# Patient Record
Sex: FEMALE | Race: WHITE | Employment: OTHER | URBAN - METROPOLITAN AREA
[De-identification: names, ages, dates, MRNs, and addresses within clinical notes are randomized per-mention and may not be internally consistent; named-entity substitution may affect disease eponyms.]

---

## 2021-02-01 DIAGNOSIS — Z23 NEED FOR VACCINATION: ICD-10-CM

## 2021-02-08 ENCOUNTER — PATIENT MESSAGE (OUTPATIENT)
Dept: TELEHEALTH | Age: 66
End: 2021-02-08

## 2021-02-11 ENCOUNTER — IMMUNIZATION (OUTPATIENT)
Dept: LAB | Facility: HOSPITAL | Age: 66
End: 2021-02-11
Attending: HOSPITALIST
Payer: COMMERCIAL

## 2021-02-11 DIAGNOSIS — Z23 NEED FOR VACCINATION: Primary | ICD-10-CM

## 2021-02-11 PROCEDURE — 0011A SARSCOV2 VAC 100MCG/0.5ML IM: CPT

## 2021-03-11 ENCOUNTER — IMMUNIZATION (OUTPATIENT)
Dept: LAB | Facility: HOSPITAL | Age: 66
End: 2021-03-11
Attending: EMERGENCY MEDICINE
Payer: COMMERCIAL

## 2021-03-11 DIAGNOSIS — Z23 NEED FOR VACCINATION: Primary | ICD-10-CM

## 2021-03-11 PROCEDURE — 0012A SARSCOV2 VAC 100MCG/0.5ML IM: CPT

## 2022-12-10 ENCOUNTER — HOSPITAL ENCOUNTER (EMERGENCY)
Facility: HOSPITAL | Age: 67
Discharge: HOME | End: 2022-12-10
Attending: EMERGENCY MEDICINE
Payer: MEDICARE

## 2022-12-10 ENCOUNTER — APPOINTMENT (OUTPATIENT)
Dept: CT IMAGING | Facility: HOSPITAL | Age: 67
End: 2022-12-10
Attending: EMERGENCY MEDICINE
Payer: MEDICARE

## 2022-12-10 VITALS
OXYGEN SATURATION: 98 % | HEART RATE: 77 BPM | TEMPERATURE: 98 F | SYSTOLIC BLOOD PRESSURE: 137 MMHG | DIASTOLIC BLOOD PRESSURE: 81 MMHG | RESPIRATION RATE: 22 BRPM

## 2022-12-10 DIAGNOSIS — N30.00 ACUTE CYSTITIS WITHOUT HEMATURIA: Primary | ICD-10-CM

## 2022-12-10 DIAGNOSIS — N17.9 AKI (ACUTE KIDNEY INJURY) (HCC): ICD-10-CM

## 2022-12-10 DIAGNOSIS — R40.4 TRANSIENT ALTERATION OF AWARENESS: ICD-10-CM

## 2022-12-10 PROBLEM — G62.9 NEUROPATHY: Status: ACTIVE | Noted: 2022-12-10

## 2022-12-10 PROBLEM — I10 HYPERTENSION: Status: ACTIVE | Noted: 2022-12-10

## 2022-12-10 LAB
ALBUMIN SERPL-MCNC: 3.5 G/DL (ref 3.4–5)
ALP LIVER SERPL-CCNC: 74 U/L
ALT SERPL-CCNC: 15 U/L
ANION GAP SERPL CALC-SCNC: 8 MMOL/L (ref 0–18)
AST SERPL-CCNC: 11 U/L (ref 15–37)
BASOPHILS # BLD AUTO: 0.05 X10(3) UL (ref 0–0.2)
BASOPHILS NFR BLD AUTO: 0.5 %
BILIRUB DIRECT SERPL-MCNC: 0.2 MG/DL (ref 0–0.2)
BILIRUB SERPL-MCNC: 0.6 MG/DL (ref 0.1–2)
BILIRUB UR QL: NEGATIVE
BUN BLD-MCNC: 37 MG/DL (ref 7–18)
BUN/CREAT SERPL: 30.1 (ref 10–20)
CALCIUM BLD-MCNC: 9.6 MG/DL (ref 8.5–10.1)
CHLORIDE SERPL-SCNC: 102 MMOL/L (ref 98–112)
CO2 SERPL-SCNC: 26 MMOL/L (ref 21–32)
COLOR UR: YELLOW
CREAT BLD-MCNC: 1.23 MG/DL
DEPRECATED RDW RBC AUTO: 43.8 FL (ref 35.1–46.3)
EOSINOPHIL # BLD AUTO: 0.09 X10(3) UL (ref 0–0.7)
EOSINOPHIL NFR BLD AUTO: 0.9 %
ERYTHROCYTE [DISTWIDTH] IN BLOOD BY AUTOMATED COUNT: 12.3 % (ref 11–15)
FLUAV + FLUBV RNA SPEC NAA+PROBE: NEGATIVE
FLUAV + FLUBV RNA SPEC NAA+PROBE: NEGATIVE
GFR SERPLBLD BASED ON 1.73 SQ M-ARVRAT: 48 ML/MIN/1.73M2 (ref 60–?)
GLUCOSE BLD-MCNC: 89 MG/DL (ref 70–99)
GLUCOSE UR-MCNC: NEGATIVE MG/DL
HCT VFR BLD AUTO: 46.5 %
HGB BLD-MCNC: 15.7 G/DL
HYALINE CASTS #/AREA URNS AUTO: PRESENT /LPF
IMM GRANULOCYTES # BLD AUTO: 0.03 X10(3) UL (ref 0–1)
IMM GRANULOCYTES NFR BLD: 0.3 %
KETONES UR-MCNC: NEGATIVE MG/DL
LIPASE SERPL-CCNC: 257 U/L (ref 73–393)
LYMPHOCYTES # BLD AUTO: 3 X10(3) UL (ref 1–4)
LYMPHOCYTES NFR BLD AUTO: 29.2 %
MCH RBC QN AUTO: 32.6 PG (ref 26–34)
MCHC RBC AUTO-ENTMCNC: 33.8 G/DL (ref 31–37)
MCV RBC AUTO: 96.5 FL
MONOCYTES # BLD AUTO: 1 X10(3) UL (ref 0.1–1)
MONOCYTES NFR BLD AUTO: 9.7 %
NEUTROPHILS # BLD AUTO: 6.11 X10 (3) UL (ref 1.5–7.7)
NEUTROPHILS # BLD AUTO: 6.11 X10(3) UL (ref 1.5–7.7)
NEUTROPHILS NFR BLD AUTO: 59.4 %
NITRITE UR QL STRIP.AUTO: NEGATIVE
OSMOLALITY SERPL CALC.SUM OF ELEC: 290 MOSM/KG (ref 275–295)
PH UR: 5 [PH] (ref 5–8)
PLATELET # BLD AUTO: 291 10(3)UL (ref 150–450)
POTASSIUM SERPL-SCNC: 3.6 MMOL/L (ref 3.5–5.1)
PROT SERPL-MCNC: 6.4 G/DL (ref 6.4–8.2)
PROT UR-MCNC: 30 MG/DL
RBC # BLD AUTO: 4.82 X10(6)UL
RSV RNA SPEC NAA+PROBE: NEGATIVE
SARS-COV-2 RNA RESP QL NAA+PROBE: NOT DETECTED
SODIUM SERPL-SCNC: 136 MMOL/L (ref 136–145)
SP GR UR STRIP: 1.02 (ref 1–1.03)
UROBILINOGEN UR STRIP-ACNC: 2
VIT C UR-MCNC: NEGATIVE MG/DL
WBC # BLD AUTO: 10.3 X10(3) UL (ref 4–11)

## 2022-12-10 PROCEDURE — 99285 EMERGENCY DEPT VISIT HI MDM: CPT

## 2022-12-10 PROCEDURE — 93010 ELECTROCARDIOGRAM REPORT: CPT

## 2022-12-10 PROCEDURE — 85025 COMPLETE CBC W/AUTO DIFF WBC: CPT | Performed by: EMERGENCY MEDICINE

## 2022-12-10 PROCEDURE — 87086 URINE CULTURE/COLONY COUNT: CPT | Performed by: EMERGENCY MEDICINE

## 2022-12-10 PROCEDURE — 96360 HYDRATION IV INFUSION INIT: CPT

## 2022-12-10 PROCEDURE — 80076 HEPATIC FUNCTION PANEL: CPT | Performed by: EMERGENCY MEDICINE

## 2022-12-10 PROCEDURE — 99284 EMERGENCY DEPT VISIT MOD MDM: CPT

## 2022-12-10 PROCEDURE — 93005 ELECTROCARDIOGRAM TRACING: CPT

## 2022-12-10 PROCEDURE — 80048 BASIC METABOLIC PNL TOTAL CA: CPT | Performed by: EMERGENCY MEDICINE

## 2022-12-10 PROCEDURE — 81001 URINALYSIS AUTO W/SCOPE: CPT | Performed by: EMERGENCY MEDICINE

## 2022-12-10 PROCEDURE — 70450 CT HEAD/BRAIN W/O DYE: CPT | Performed by: EMERGENCY MEDICINE

## 2022-12-10 PROCEDURE — 96361 HYDRATE IV INFUSION ADD-ON: CPT

## 2022-12-10 PROCEDURE — 83690 ASSAY OF LIPASE: CPT | Performed by: EMERGENCY MEDICINE

## 2022-12-10 PROCEDURE — 0241U SARS-COV-2/FLU A AND B/RSV BY PCR (GENEXPERT): CPT | Performed by: EMERGENCY MEDICINE

## 2022-12-10 RX ORDER — CEPHALEXIN 500 MG/1
500 CAPSULE ORAL 2 TIMES DAILY
Qty: 10 CAPSULE | Refills: 0 | Status: SHIPPED | OUTPATIENT
Start: 2022-12-10 | End: 2022-12-15

## 2022-12-10 NOTE — ED INITIAL ASSESSMENT (HPI)
Patient arrives through triage in wheelchair with daughter with multiple c/o. Patient c/o of nausea, vomiting, diarrhea since Sunday, patient and daughter report confusion since today. Patient alert and oriented x4 able to answer questions appropriately, albeit slowly.

## 2022-12-11 LAB
ATRIAL RATE: 82 BPM
P AXIS: 44 DEGREES
P-R INTERVAL: 122 MS
Q-T INTERVAL: 388 MS
QRS DURATION: 76 MS
QTC CALCULATION (BEZET): 453 MS
R AXIS: 43 DEGREES
T AXIS: 53 DEGREES
VENTRICULAR RATE: 82 BPM

## 2023-07-11 ENCOUNTER — TELEPHONE (OUTPATIENT)
Dept: HEMATOLOGY/ONCOLOGY | Facility: HOSPITAL | Age: 68
End: 2023-07-11

## 2023-07-24 ENCOUNTER — OFFICE VISIT (OUTPATIENT)
Dept: HEMATOLOGY/ONCOLOGY | Facility: HOSPITAL | Age: 68
End: 2023-07-24
Attending: SPECIALIST
Payer: MEDICARE

## 2023-07-24 ENCOUNTER — TELEPHONE (OUTPATIENT)
Dept: HEMATOLOGY/ONCOLOGY | Facility: HOSPITAL | Age: 68
End: 2023-07-24

## 2023-07-24 VITALS
TEMPERATURE: 98 F | WEIGHT: 130.81 LBS | SYSTOLIC BLOOD PRESSURE: 135 MMHG | OXYGEN SATURATION: 98 % | HEART RATE: 79 BPM | BODY MASS INDEX: 24.07 KG/M2 | RESPIRATION RATE: 18 BRPM | HEIGHT: 62 IN | DIASTOLIC BLOOD PRESSURE: 66 MMHG

## 2023-07-24 DIAGNOSIS — C49.9 LEIOMYOSARCOMA (HCC): Primary | ICD-10-CM

## 2023-07-24 PROCEDURE — 99205 OFFICE O/P NEW HI 60 MIN: CPT | Performed by: SPECIALIST

## 2023-07-24 PROCEDURE — G2212 PROLONG OUTPT/OFFICE VIS: HCPCS | Performed by: SPECIALIST

## 2023-07-24 RX ORDER — LOSARTAN POTASSIUM AND HYDROCHLOROTHIAZIDE 12.5; 5 MG/1; MG/1
1 TABLET ORAL DAILY
COMMUNITY
Start: 2022-05-21

## 2023-07-24 RX ORDER — DULOXETIN HYDROCHLORIDE 30 MG/1
30 CAPSULE, DELAYED RELEASE ORAL 3 TIMES DAILY
COMMUNITY
Start: 2023-06-29

## 2023-07-24 RX ORDER — LORAZEPAM 0.5 MG/1
0.5 TABLET ORAL NIGHTLY
COMMUNITY
Start: 2023-06-20

## 2023-07-24 RX ORDER — MELATONIN
1000 DAILY
COMMUNITY
Start: 2021-10-25

## 2023-07-24 NOTE — TELEPHONE ENCOUNTER
Called and spoke with Frank Dwyer from [facility] pathology department to inquire if they will test a tumor sample for estrogen receptors per Dr. Mauro Mason. She stated they do not do that and our office will need to send [facility] pathology department a fax request with the specific case ID number, patient consent for release of information, and fed ex label. I stated understanding and thanked her for the information.

## 2023-07-24 NOTE — TELEPHONE ENCOUNTER
Called and unable to reach patient. Left VM letting her know we have to request additional records from [facility] and they require her written authorization/consent for them to share medical information with us. Told her she needs to sign a form in person. Let her know to give our office a call back to discuss further. Provided office phone number.

## 2023-07-25 ENCOUNTER — DOCUMENTATION ONLY (OUTPATIENT)
Dept: HEMATOLOGY/ONCOLOGY | Facility: HOSPITAL | Age: 68
End: 2023-07-25

## 2023-07-25 NOTE — PROGRESS NOTES
Faxed Fed Ex Account Number for [facility] and patient's authorization/consent form for [facility] Pathology Department to send us the tissue block sides for Case ID # [id number] from 12/16/2022 to our [facility] Pathology Department to be tested for estrogen receptors. Forms faxed to [phone]. Fax success confirmation received. Will notify [place] pathology department to be expecting this specimen and to test for estrogen receptors once they receive it.

## 2023-07-26 ENCOUNTER — DOCUMENTATION ONLY (OUTPATIENT)
Dept: HEMATOLOGY/ONCOLOGY | Facility: HOSPITAL | Age: 68
End: 2023-07-26

## 2023-07-26 DIAGNOSIS — C49.9 LEIOMYOSARCOMA (HCC): Primary | ICD-10-CM

## 2023-07-26 NOTE — PROGRESS NOTES
Called and spoke with Arlette Milton, from pathology to let her know Dr. Harleen Jacques requested this patient's tissue block slides for case ID # [id number] done on 12/16/2022 from [facility] pathology department to be sent to our pathology department. Told her he wants to test the sample for estrogen receptors. She requested to put a order in and specify to test for estrogen receptors in the comment. Pathology specimen order put in. Patricio Carmona stated they will keep a look out for the tissue block slides.

## 2023-08-02 ENCOUNTER — TELEPHONE (OUTPATIENT)
Dept: HEMATOLOGY/ONCOLOGY | Facility: HOSPITAL | Age: 68
End: 2023-08-02

## 2023-08-07 ENCOUNTER — LAB ENCOUNTER (OUTPATIENT)
Dept: LAB | Facility: HOSPITAL | Age: 68
End: 2023-08-07
Attending: SPECIALIST
Payer: MEDICARE

## 2023-08-07 DIAGNOSIS — C49.9 LEIOMYOSARCOMA (HCC): ICD-10-CM

## 2023-08-07 PROCEDURE — 88342 IMHCHEM/IMCYTCHM 1ST ANTB: CPT

## 2023-08-07 PROCEDURE — 88360 TUMOR IMMUNOHISTOCHEM/MANUAL: CPT

## 2023-08-07 PROCEDURE — 88323 CONSLTJ&REPRT MATRL PREP SLD: CPT

## 2023-08-07 PROCEDURE — 88321 CONSLTJ&REPRT SLD PREP ELSWR: CPT

## 2023-08-18 ENCOUNTER — OFFICE VISIT (OUTPATIENT)
Dept: HEMATOLOGY/ONCOLOGY | Facility: HOSPITAL | Age: 68
End: 2023-08-18
Attending: SPECIALIST
Payer: MEDICARE

## 2023-08-18 VITALS
TEMPERATURE: 98 F | WEIGHT: 124 LBS | RESPIRATION RATE: 18 BRPM | SYSTOLIC BLOOD PRESSURE: 133 MMHG | HEART RATE: 102 BPM | DIASTOLIC BLOOD PRESSURE: 86 MMHG | OXYGEN SATURATION: 98 % | BODY MASS INDEX: 22.82 KG/M2 | HEIGHT: 62 IN

## 2023-08-18 DIAGNOSIS — C49.9 LEIOMYOSARCOMA (HCC): Primary | ICD-10-CM

## 2023-08-18 DIAGNOSIS — C78.01 MALIGNANT NEOPLASM METASTATIC TO RIGHT LUNG (HCC): ICD-10-CM

## 2023-08-18 PROCEDURE — 99214 OFFICE O/P EST MOD 30 MIN: CPT | Performed by: SPECIALIST

## 2023-09-01 ENCOUNTER — HOSPITAL ENCOUNTER (OUTPATIENT)
Dept: NUCLEAR MEDICINE | Facility: HOSPITAL | Age: 68
Discharge: HOME | End: 2023-09-01
Attending: SPECIALIST
Payer: MEDICARE

## 2023-09-01 DIAGNOSIS — C78.01 MALIGNANT NEOPLASM METASTATIC TO RIGHT LUNG (HCC): ICD-10-CM

## 2023-09-01 DIAGNOSIS — C49.9 LEIOMYOSARCOMA (HCC): ICD-10-CM

## 2023-09-01 LAB — GLUCOSE BLDC GLUCOMTR-MCNC: 97 MG/DL (ref 70–99)

## 2023-09-01 PROCEDURE — 82962 GLUCOSE BLOOD TEST: CPT

## 2023-09-01 PROCEDURE — 78815 PET IMAGE W/CT SKULL-THIGH: CPT | Performed by: SPECIALIST

## 2023-09-05 ENCOUNTER — TELEPHONE (OUTPATIENT)
Dept: HEMATOLOGY/ONCOLOGY | Facility: HOSPITAL | Age: 68
End: 2023-09-05

## 2023-09-06 ENCOUNTER — TELEPHONE (OUTPATIENT)
Dept: RADIATION ONCOLOGY | Facility: HOSPITAL | Age: 68
End: 2023-09-06

## 2023-09-12 ENCOUNTER — TELEPHONE (OUTPATIENT)
Dept: RADIATION ONCOLOGY | Facility: HOSPITAL | Age: 68
End: 2023-09-12

## 2023-09-13 ENCOUNTER — TELEPHONE (OUTPATIENT)
Dept: HEMATOLOGY/ONCOLOGY | Facility: HOSPITAL | Age: 68
End: 2023-09-13

## 2023-09-21 ENCOUNTER — TELEPHONE (OUTPATIENT)
Dept: HEMATOLOGY/ONCOLOGY | Facility: HOSPITAL | Age: 68
End: 2023-09-21

## 2023-09-21 NOTE — TELEPHONE ENCOUNTER
This is my second contact to Osvaldo Dias to assist with smoking cessation if interested. She was not home and I left a v.m. with my contact number if interested in contacting me back.  CARLOS Johnson

## 2023-09-26 ENCOUNTER — TELEPHONE (OUTPATIENT)
Dept: HEMATOLOGY/ONCOLOGY | Facility: HOSPITAL | Age: 68
End: 2023-09-26

## 2023-09-27 NOTE — TELEPHONE ENCOUNTER
I had left Dionne prior voice mails and she returned the call today re smoking cessation options. She is interested in quitting now. We discussed the two options includin) In-person consultation with one of our Smoking Cessation Advanced Practice Nurses or the [place] ([facility]). Descriptions of both programs were reviewed and she chose to use the [facility]. A referral was placed for the [facility] and was faxed with permission from the patient. She should be contacted within 24 hours of receipt of the referral.    Some smoking cessation resources will be sent to the patient via the mail for future use. My contact number was provided if questions arise.   CARLOS Seaman      Tobacco cessation counseling given

## 2023-10-01 ENCOUNTER — APPOINTMENT (OUTPATIENT)
Dept: RADIATION ONCOLOGY | Facility: HOSPITAL | Age: 68
End: 2023-10-01
Attending: RADIOLOGY
Payer: MEDICARE

## 2023-10-06 ENCOUNTER — APPOINTMENT (OUTPATIENT)
Dept: RADIATION ONCOLOGY | Facility: HOSPITAL | Age: 68
End: 2023-10-06
Attending: RADIOLOGY
Payer: MEDICARE

## 2023-10-06 PROCEDURE — 77470 SPECIAL RADIATION TREATMENT: CPT | Performed by: RADIOLOGY

## 2023-10-06 PROCEDURE — 77399 UNLISTED PX MED RADJ PHYSICS: CPT | Performed by: RADIOLOGY

## 2023-10-06 PROCEDURE — 77334 RADIATION TREATMENT AID(S): CPT | Performed by: RADIOLOGY

## 2023-10-16 PROCEDURE — 77300 RADIATION THERAPY DOSE PLAN: CPT | Performed by: RADIOLOGY

## 2023-10-16 PROCEDURE — 77293 RESPIRATOR MOTION MGMT SIMUL: CPT | Performed by: RADIOLOGY

## 2023-10-16 PROCEDURE — 77338 DESIGN MLC DEVICE FOR IMRT: CPT | Performed by: RADIOLOGY

## 2023-10-16 PROCEDURE — 77301 RADIOTHERAPY DOSE PLAN IMRT: CPT | Performed by: RADIOLOGY

## 2023-11-01 ENCOUNTER — APPOINTMENT (OUTPATIENT)
Dept: RADIATION ONCOLOGY | Facility: HOSPITAL | Age: 68
End: 2023-11-01
Attending: RADIOLOGY
Payer: MEDICARE

## 2023-11-01 PROCEDURE — 77373 STRTCTC BDY RAD THER TX DLVR: CPT | Performed by: RADIOLOGY

## 2023-11-03 ENCOUNTER — APPOINTMENT (OUTPATIENT)
Dept: RADIATION ONCOLOGY | Facility: HOSPITAL | Age: 68
End: 2023-11-03
Attending: RADIOLOGY
Payer: MEDICARE

## 2023-11-03 PROCEDURE — 77373 STRTCTC BDY RAD THER TX DLVR: CPT | Performed by: RADIOLOGY

## 2023-11-06 ENCOUNTER — APPOINTMENT (OUTPATIENT)
Dept: RADIATION ONCOLOGY | Facility: HOSPITAL | Age: 68
End: 2023-11-06
Attending: RADIOLOGY
Payer: MEDICARE

## 2023-11-06 PROCEDURE — 77373 STRTCTC BDY RAD THER TX DLVR: CPT | Performed by: RADIOLOGY

## 2023-11-08 ENCOUNTER — OFFICE VISIT (OUTPATIENT)
Dept: RADIATION ONCOLOGY | Facility: HOSPITAL | Age: 68
End: 2023-11-08
Attending: RADIOLOGY
Payer: MEDICARE

## 2023-11-08 VITALS
OXYGEN SATURATION: 100 % | BODY MASS INDEX: 24 KG/M2 | SYSTOLIC BLOOD PRESSURE: 130 MMHG | WEIGHT: 129.38 LBS | RESPIRATION RATE: 16 BRPM | HEART RATE: 66 BPM | TEMPERATURE: 97 F | DIASTOLIC BLOOD PRESSURE: 57 MMHG

## 2023-11-08 DIAGNOSIS — C78.01 MALIGNANT NEOPLASM METASTATIC TO RIGHT LUNG (HCC): Primary | ICD-10-CM

## 2023-11-08 DIAGNOSIS — C78.01 SECONDARY MALIGNANCY OF RIGHT UPPER LOBE OF LUNG (HCC): Primary | ICD-10-CM

## 2023-11-08 DIAGNOSIS — C49.9 LEIOMYOSARCOMA (HCC): ICD-10-CM

## 2023-11-08 PROCEDURE — 77373 STRTCTC BDY RAD THER TX DLVR: CPT | Performed by: RADIOLOGY

## 2023-11-08 NOTE — PATIENT INSTRUCTIONS
Follow up with Dr. Juan Garcia in 3 months. Tyler Wood will call you to schedule your follow up appointment. Please call [phone] with any radiation questions. Schedule your CT scan prior to your follow up. Call [phone] to schedule.

## 2023-11-08 NOTE — TELEPHONE ENCOUNTER
Called patient to schedule follow up with Dr. Sarahi Means for 12 weeks after her Radiation- Agreed to follow up on 12/15 will have schedulers call patient to schedule PET for a few days before follow up. Patient aware and in agreement with date of appointment and plan.

## 2023-11-10 ENCOUNTER — APPOINTMENT (OUTPATIENT)
Dept: RADIATION ONCOLOGY | Facility: HOSPITAL | Age: 68
End: 2023-11-10
Attending: RADIOLOGY
Payer: MEDICARE

## 2023-11-10 PROCEDURE — 77373 STRTCTC BDY RAD THER TX DLVR: CPT | Performed by: RADIOLOGY

## 2023-11-10 PROCEDURE — 77336 RADIATION PHYSICS CONSULT: CPT | Performed by: RADIOLOGY

## 2023-11-27 ENCOUNTER — DOCUMENTATION ONLY (OUTPATIENT)
Dept: HEMATOLOGY/ONCOLOGY | Facility: HOSPITAL | Age: 68
End: 2023-11-27

## 2023-11-27 NOTE — PROGRESS NOTES
ONCOLOGY NUTRITION SCREEN complete as triggered by [facility] dx of malignant neoplasm metastatic to lung. Chart reviewed. Pt appears nutritionally stable at present. RD available on consult.

## 2023-12-04 ENCOUNTER — TELEPHONE (OUTPATIENT)
Dept: RADIATION ONCOLOGY | Facility: HOSPITAL | Age: 68
End: 2023-12-04

## 2023-12-04 NOTE — TELEPHONE ENCOUNTER
12/4/23 Spoke with Lopez Boothe and she said that she will call and get the CT scan set up.  I told her as soon as we see when it is we can call her to schedule her visit in early Feb.

## 2023-12-11 ENCOUNTER — HOSPITAL ENCOUNTER (OUTPATIENT)
Dept: NUCLEAR MEDICINE | Facility: HOSPITAL | Age: 68
Discharge: HOME | End: 2023-12-11
Attending: SPECIALIST
Payer: MEDICARE

## 2023-12-11 DIAGNOSIS — C78.01 MALIGNANT NEOPLASM METASTATIC TO RIGHT LUNG (HCC): ICD-10-CM

## 2023-12-11 DIAGNOSIS — C49.9 LEIOMYOSARCOMA (HCC): ICD-10-CM

## 2023-12-11 LAB — GLUCOSE BLDC GLUCOMTR-MCNC: 85 MG/DL (ref 70–99)

## 2023-12-11 PROCEDURE — 78815 PET IMAGE W/CT SKULL-THIGH: CPT | Performed by: SPECIALIST

## 2023-12-11 PROCEDURE — 82962 GLUCOSE BLOOD TEST: CPT

## 2023-12-13 ENCOUNTER — TELEPHONE (OUTPATIENT)
Dept: RADIATION ONCOLOGY | Facility: HOSPITAL | Age: 68
End: 2023-12-13

## 2023-12-13 NOTE — TELEPHONE ENCOUNTER
Pt scheduled CT scan for 12/18/2023. Called pt to notify her that this should be done 3 months after radiation completed, so February 2024. Pt is going to call CS to re-schedule scan. Aware Timur Gerber, our , will contact her to schedule a follow up with Dr. Gela Gasca after CT scan is complete. Pt states her understanding of the above information.

## 2023-12-15 ENCOUNTER — OFFICE VISIT (OUTPATIENT)
Dept: HEMATOLOGY/ONCOLOGY | Facility: HOSPITAL | Age: 68
End: 2023-12-15
Attending: SPECIALIST
Payer: MEDICARE

## 2023-12-15 VITALS
HEART RATE: 77 BPM | RESPIRATION RATE: 16 BRPM | HEIGHT: 62 IN | OXYGEN SATURATION: 100 % | WEIGHT: 132 LBS | SYSTOLIC BLOOD PRESSURE: 122 MMHG | BODY MASS INDEX: 24.29 KG/M2 | TEMPERATURE: 98 F | DIASTOLIC BLOOD PRESSURE: 79 MMHG

## 2023-12-15 DIAGNOSIS — C49.9 LEIOMYOSARCOMA (HCC): Primary | ICD-10-CM

## 2023-12-15 DIAGNOSIS — C78.00 SECONDARY SARCOMA OF LUNG, UNSPECIFIED LATERALITY (HCC): ICD-10-CM

## 2023-12-15 DIAGNOSIS — F17.200 NEEDS SMOKING CESSATION EDUCATION: ICD-10-CM

## 2023-12-15 DIAGNOSIS — C78.01 MALIGNANT NEOPLASM METASTATIC TO RIGHT LUNG (HCC): ICD-10-CM

## 2023-12-15 DIAGNOSIS — Z45.2 ENCOUNTER FOR CARE RELATED TO PORT-A-CATH: Primary | ICD-10-CM

## 2023-12-15 DIAGNOSIS — Z72.0 TOBACCO USE: ICD-10-CM

## 2023-12-15 DIAGNOSIS — C49.9 SECONDARY SARCOMA OF LUNG, UNSPECIFIED LATERALITY (HCC): ICD-10-CM

## 2023-12-15 PROCEDURE — 96523 IRRIG DRUG DELIVERY DEVICE: CPT

## 2023-12-15 PROCEDURE — 99214 OFFICE O/P EST MOD 30 MIN: CPT | Performed by: SPECIALIST

## 2023-12-15 RX ORDER — SODIUM CHLORIDE 9 MG/ML
10 INJECTION INTRAVENOUS ONCE
OUTPATIENT
Start: 2023-12-15

## 2023-12-15 RX ORDER — HEPARIN SODIUM (PORCINE) LOCK FLUSH IV SOLN 100 UNIT/ML 100 UNIT/ML
5 SOLUTION INTRAVENOUS ONCE
Status: COMPLETED | OUTPATIENT
Start: 2023-12-15 | End: 2023-12-15

## 2023-12-15 RX ORDER — HEPARIN SODIUM (PORCINE) LOCK FLUSH IV SOLN 100 UNIT/ML 100 UNIT/ML
5 SOLUTION INTRAVENOUS ONCE
OUTPATIENT
Start: 2023-12-15

## 2023-12-15 RX ORDER — HEPARIN SODIUM (PORCINE) LOCK FLUSH IV SOLN 100 UNIT/ML 100 UNIT/ML
5 SOLUTION INTRAVENOUS ONCE
Status: CANCELLED | OUTPATIENT
Start: 2023-12-15

## 2023-12-15 RX ADMIN — HEPARIN SODIUM (PORCINE) LOCK FLUSH IV SOLN 100 UNIT/ML 500 UNITS: 100 SOLUTION INTRAVENOUS at 10:30:00

## 2023-12-18 ENCOUNTER — TELEPHONE (OUTPATIENT)
Dept: HEMATOLOGY/ONCOLOGY | Facility: HOSPITAL | Age: 68
End: 2023-12-18

## 2023-12-19 NOTE — TELEPHONE ENCOUNTER
A referral was placed for smoking cessation re Mianlizeth Charles. I had spoken with her in 9/2023 (see telephone encounter) for the same purpose. At that time she was interested in utilizing the [facility]) for support and a referral was placed. Since then, she has not utilized that support, but is interested in quitting smoking again. She was offered this or the Smoking Cessation [facility] clinic. She reports having too many office visits and prefers the [facility] again. The referral was sent and she should contact me if she does not hear from them within 48 hours. She also mentioned that her peripheral neuropathy pain has worsened and she forgot to mention this in her office visit last week. She is interested in medication, but had tried Gabapentin in the past and it was not helpful.  I will relay this information to her team.  CARLOS Cross

## 2023-12-19 NOTE — TELEPHONE ENCOUNTER
Attempted to reach patient regarding recommendations to start Lyrica for peripheral neuropathy. Waiting for call back from patient.

## 2023-12-20 ENCOUNTER — TELEPHONE (OUTPATIENT)
Dept: HEMATOLOGY/ONCOLOGY | Facility: HOSPITAL | Age: 68
End: 2023-12-20

## 2024-02-09 ENCOUNTER — HOSPITAL ENCOUNTER (OUTPATIENT)
Dept: CT IMAGING | Facility: HOSPITAL | Age: 69
Discharge: HOME | End: 2024-02-09
Attending: RADIOLOGY
Payer: MEDICARE

## 2024-02-09 DIAGNOSIS — C78.01 SECONDARY MALIGNANCY OF RIGHT UPPER LOBE OF LUNG (HCC): ICD-10-CM

## 2024-02-09 PROCEDURE — 71250 CT THORAX DX C-: CPT | Performed by: RADIOLOGY

## 2024-02-12 ENCOUNTER — TELEPHONE (OUTPATIENT)
Dept: HEMATOLOGY/ONCOLOGY | Facility: HOSPITAL | Age: 69
End: 2024-02-12

## 2024-02-12 NOTE — TELEPHONE ENCOUNTER
Called patient to let her know that an appointment with Dr. Zelaya has been made for her to follow up on Feb. 16th at 12:00 pm here at the cancer center.  Asked her to call back if this does not work for her, callback number provided.

## 2024-02-14 ENCOUNTER — OFFICE VISIT (OUTPATIENT)
Dept: RADIATION ONCOLOGY | Facility: HOSPITAL | Age: 69
End: 2024-02-14
Attending: RADIOLOGY
Payer: MEDICARE

## 2024-02-14 VITALS
DIASTOLIC BLOOD PRESSURE: 60 MMHG | WEIGHT: 137.63 LBS | SYSTOLIC BLOOD PRESSURE: 132 MMHG | HEART RATE: 71 BPM | TEMPERATURE: 97 F | RESPIRATION RATE: 16 BRPM | BODY MASS INDEX: 25 KG/M2 | OXYGEN SATURATION: 98 %

## 2024-02-14 DIAGNOSIS — C78.01 SECONDARY MALIGNANCY OF RIGHT UPPER LOBE OF LUNG (HCC): Primary | ICD-10-CM

## 2024-02-14 PROCEDURE — 99211 OFF/OP EST MAY X REQ PHY/QHP: CPT

## 2024-02-14 NOTE — PROGRESS NOTES
Nursing Follow-Up Note    Patient: Dionne Bello  YOB: 1955  Age: 68 year old  Radiation Oncologist: Dr. Joe Solares  Referring Physician: Joe Solares  Chief Complaint:   Chief Complaint   Patient presents with    Radiation     Date: 2/14/2024    Toxicities:   Fatigue Grade 1= Fatigue relieved by rest  Cough Grade 0= None  Dyspnea Grade 0= None      Vital Signs: /60 (BP Location: Left arm, Patient Position: Sitting, Cuff Size: adult)   Pulse 71   Temp 97.2 °F (36.2 °C) (Temporal)   Resp 16   Wt 62.4 kg (137 lb 9.6 oz)   SpO2 98%   BMI 25.17 kg/m² ,   Wt Readings from Last 6 Encounters:   02/14/24 62.4 kg (137 lb 9.6 oz)   12/15/23 59.9 kg (132 lb)   11/08/23 58.7 kg (129 lb 6.4 oz)   09/12/23 57.2 kg (126 lb 3.2 oz)   08/18/23 56.2 kg (124 lb)   07/24/23 59.3 kg (130 lb 12.8 oz)       Allergies:    Allergies   Allergen Reactions    Iopamidol ANAPHYLAXIS    Radiology Contrast Iodinated Dyes ANAPHYLAXIS       Nursing Note:   Pt has a cold now.   Continues to smoke 3 cigarettes daily.  Eating and drinking well.   Pt to see Dr. Zelaya 2/16/24.  Dr. Leobardo Solares to see pt.

## 2024-02-14 NOTE — PATIENT INSTRUCTIONS
Follow up with Dr. Leobardo Solares as needed.    Please call [phone] with any radiation questions.

## 2024-02-15 NOTE — PROGRESS NOTES
[facility]    PATIENT'S NAME: FAHEEM VALLE   RADIATION ONCOLOGIST: Joe Solares MD   PATIENT ACCOUNT #: [id number] LOCATION: [facility]   MEDICAL RECORD #: [id number] YOB: 1955   FOLLOW-UP DATE: 02/14/2024       RADIATION ONCOLOGY FOLLOW-UP NOTE    REFERRING PHYSICIAN:  Justyn Zelaya MD.    DIAGNOSIS:  Metastatic leiomyosarcoma.    REGION TREATED:  Right upper lobe mass, 5000 cGy, completed 11/10/2023.    INTERVAL SINCE COMPLETION OF RADIATION THERAPY:  Three months.    PATIENT STATUS:  Control within treatment area but significant progression throughout remainder of lung.    HISTORY:  The patient is a 68-year-old female who presents today for a routine followup visit.  She has a long history of leiomyosarcoma initially diagnosed back in 2014.  Her treatments had all been done at an outside institution until fairly recently.  At the time of diagnosis, she underwent surgery and resection of the peritoneal mass which showed a high-grade leiomyosarcoma, and she was treated with adjuvant radiation.  She was later found to have a scalp mass in 2015 and resection showed this to also be leiomyosarcoma.  By 2019, a biopsy of the left breast showed invasive mammary carcinoma noted to be ER/CA negative and HER2/edin positive.  She was treated with neoadjuvant chemotherapy, followed by a left breast lumpectomy and sentinel lymph node biopsy with a complete response.  She then received adjuvant trastuzumab and pertuzumab alongside adjuvant radiation.  An additional scalp mass popped up in 2020 and was again a leiomyosarcoma.  This was treated with scalp debridement and reconstruction.  By December 2022, a right superior anterior chest wall mass was found and biopsy proven to represent recurrent leiomyosarcoma again.  She was switched to trabectedin and recently received radiation to the anterior chest wall mass completing in August 2023 at [place].  A PET scan on June 28, 2023, showed 2  metastatic lesions including a second small lesion in the right upper lobe.  The chest wall lesion had been treated with radiation, but nothing had been administered to the right upper lobe mass.  As she then transferred her care to Dr. Zelaya, he recommended consultation with Radiation Oncology to discuss treatment to the sole remaining site of disease.  This area was then treated in the right upper lobe to 5000 cGy in 5 fractions completing on 11/10/2023.    The patient presents today for her first followup visit since completing her most recent course of radiation.  Overall, she feels well and has no particular symptoms or issues.  She denies any chest wall discomfort and has had no change in breathing.  She has some occasional cough, but no shortness of breath or hemoptysis.  Her appetite is normal and her weight has risen.  She denies dysphagia or other issues or complaints.    Her most recent imaging is a CT scan of the chest done on 02/09/2024.  This reveals essentially control within the recently radiated region, though there are dozens of new bilateral pulmonary nodules measuring 5 mm or less deemed highly suspicious for metastatic pulmonary nodularities.    PHYSICAL EXAMINATION:    VITAL SIGNS:  On exam today, the patient is noted to have a blood pressure of 132/60, pulse of 71, respiratory rate of 16, and temperature of 97.2.  She has a pain score of 0 and a weight of 137 pounds.  NECK:  Supple with no lymphadenopathy.  LUNGS:  Clear to auscultation bilaterally.  HEART:  Regular rate and rhythm.  Normal S1, S2.  No audible murmurs.    LYMPHATICS:  There is no supraclavicular, axillary, or inguinal lymphadenopathy.  ABDOMEN:  Benign.    IMPRESSION AND RECOMMENDATIONS:  Overall, the patient recovered from a most recent course of radiation rather well.  She did not have any side effects or lingering problems.  She did have a good response in the area which was treated, but this has become rather moot as  dozens of new lesions were found on the most recent scan.    I did bring this scan to the attention of Dr. Zelaya, and he will be seeing her in a couple days to talk about her next potential systemic therapy option.  I told the patient that I see no role for radiation in the immediate future.  For that reason, I did not schedule a specific followup visit in my clinic.  Of course, if there should be an indication for radiation in the future, I would be happy to see the patient again at that time.    Thank you very much for allowing me the opportunity to participate in the care of this patient.  If there should be any questions regarding the radiotherapy, please feel free to contact me at any time.    Dictated By Joe Solares MD  d: 02/14/2024 10:30:31  t: 02/15/2024 02:57:54  [facility] [id number]  NAD/    cc: MD Dr. Terry Schroeder

## 2024-02-16 ENCOUNTER — TELEPHONE (OUTPATIENT)
Dept: HEMATOLOGY/ONCOLOGY | Facility: HOSPITAL | Age: 69
End: 2024-02-16

## 2024-02-16 ENCOUNTER — OFFICE VISIT (OUTPATIENT)
Dept: HEMATOLOGY/ONCOLOGY | Facility: HOSPITAL | Age: 69
End: 2024-02-16
Attending: SPECIALIST
Payer: MEDICARE

## 2024-02-16 VITALS
RESPIRATION RATE: 16 BRPM | TEMPERATURE: 98 F | SYSTOLIC BLOOD PRESSURE: 133 MMHG | HEIGHT: 62 IN | BODY MASS INDEX: 24.44 KG/M2 | DIASTOLIC BLOOD PRESSURE: 70 MMHG | WEIGHT: 132.81 LBS | HEART RATE: 90 BPM | OXYGEN SATURATION: 98 %

## 2024-02-16 DIAGNOSIS — Z45.2 ENCOUNTER FOR CARE RELATED TO PORT-A-CATH: Primary | ICD-10-CM

## 2024-02-16 DIAGNOSIS — C49.9 LEIOMYOSARCOMA (HCC): Primary | ICD-10-CM

## 2024-02-16 DIAGNOSIS — C49.9 LEIOMYOSARCOMA (HCC): ICD-10-CM

## 2024-02-16 LAB
ALBUMIN SERPL-MCNC: 4.6 G/DL (ref 3.2–4.8)
ALBUMIN/GLOB SERPL: 1.8 {RATIO} (ref 1–2)
ALP LIVER SERPL-CCNC: 83 U/L
ALT SERPL-CCNC: 13 U/L
ANION GAP SERPL CALC-SCNC: 6 MMOL/L (ref 0–18)
AST SERPL-CCNC: 23 U/L (ref ?–34)
BASOPHILS # BLD AUTO: 0.03 X10(3) UL (ref 0–0.2)
BASOPHILS NFR BLD AUTO: 0.6 %
BILIRUB SERPL-MCNC: 0.3 MG/DL (ref 0.2–1.1)
BUN BLD-MCNC: 12 MG/DL (ref 9–23)
BUN/CREAT SERPL: 16.2 (ref 10–20)
CALCIUM BLD-MCNC: 9.5 MG/DL (ref 8.7–10.4)
CHLORIDE SERPL-SCNC: 109 MMOL/L (ref 98–112)
CO2 SERPL-SCNC: 28 MMOL/L (ref 21–32)
CREAT BLD-MCNC: 0.74 MG/DL
DEPRECATED RDW RBC AUTO: 45.6 FL (ref 35.1–46.3)
EGFRCR SERPLBLD CKD-EPI 2021: 88 ML/MIN/1.73M2 (ref 60–?)
EOSINOPHIL # BLD AUTO: 0.06 X10(3) UL (ref 0–0.7)
EOSINOPHIL NFR BLD AUTO: 1.3 %
ERYTHROCYTE [DISTWIDTH] IN BLOOD BY AUTOMATED COUNT: 12.8 % (ref 11–15)
GLOBULIN PLAS-MCNC: 2.5 G/DL (ref 2.8–4.4)
GLUCOSE BLD-MCNC: 88 MG/DL (ref 70–99)
HCT VFR BLD AUTO: 40.1 %
HGB BLD-MCNC: 14 G/DL
IMM GRANULOCYTES # BLD AUTO: 0.01 X10(3) UL (ref 0–1)
IMM GRANULOCYTES NFR BLD: 0.2 %
LYMPHOCYTES # BLD AUTO: 1.1 X10(3) UL (ref 1–4)
LYMPHOCYTES NFR BLD AUTO: 23.1 %
MAGNESIUM SERPL-MCNC: 1.9 MG/DL (ref 1.6–2.6)
MCH RBC QN AUTO: 34.1 PG (ref 26–34)
MCHC RBC AUTO-ENTMCNC: 34.9 G/DL (ref 31–37)
MCV RBC AUTO: 97.6 FL
MONOCYTES # BLD AUTO: 0.56 X10(3) UL (ref 0.1–1)
MONOCYTES NFR BLD AUTO: 11.7 %
NEUTROPHILS # BLD AUTO: 3.01 X10 (3) UL (ref 1.5–7.7)
NEUTROPHILS # BLD AUTO: 3.01 X10(3) UL (ref 1.5–7.7)
NEUTROPHILS NFR BLD AUTO: 63.1 %
OSMOLALITY SERPL CALC.SUM OF ELEC: 295 MOSM/KG (ref 275–295)
PHOSPHATE SERPL-MCNC: 3.3 MG/DL (ref 2.4–5.1)
PLATELET # BLD AUTO: 239 10(3)UL (ref 150–450)
POTASSIUM SERPL-SCNC: 4 MMOL/L (ref 3.5–5.1)
PROT SERPL-MCNC: 7.1 G/DL (ref 5.7–8.2)
RBC # BLD AUTO: 4.11 X10(6)UL
SODIUM SERPL-SCNC: 143 MMOL/L (ref 136–145)
WBC # BLD AUTO: 4.8 X10(3) UL (ref 4–11)

## 2024-02-16 PROCEDURE — G2211 COMPLEX E/M VISIT ADD ON: HCPCS | Performed by: SPECIALIST

## 2024-02-16 PROCEDURE — 83735 ASSAY OF MAGNESIUM: CPT

## 2024-02-16 PROCEDURE — 84100 ASSAY OF PHOSPHORUS: CPT

## 2024-02-16 PROCEDURE — 85025 COMPLETE CBC W/AUTO DIFF WBC: CPT

## 2024-02-16 PROCEDURE — 99215 OFFICE O/P EST HI 40 MIN: CPT | Performed by: SPECIALIST

## 2024-02-16 PROCEDURE — 80053 COMPREHEN METABOLIC PANEL: CPT

## 2024-02-16 PROCEDURE — 36591 DRAW BLOOD OFF VENOUS DEVICE: CPT

## 2024-02-16 RX ORDER — HEPARIN SODIUM (PORCINE) LOCK FLUSH IV SOLN 100 UNIT/ML 100 UNIT/ML
5 SOLUTION INTRAVENOUS ONCE
OUTPATIENT
Start: 2024-02-16

## 2024-02-16 RX ORDER — SODIUM CHLORIDE 9 MG/ML
10 INJECTION, SOLUTION INTRAMUSCULAR; INTRAVENOUS; SUBCUTANEOUS ONCE
OUTPATIENT
Start: 2024-02-16

## 2024-02-16 RX ORDER — HEPARIN SODIUM (PORCINE) LOCK FLUSH IV SOLN 100 UNIT/ML 100 UNIT/ML
5 SOLUTION INTRAVENOUS ONCE
Status: COMPLETED | OUTPATIENT
Start: 2024-02-16 | End: 2024-02-16

## 2024-02-16 RX ADMIN — HEPARIN SODIUM (PORCINE) LOCK FLUSH IV SOLN 100 UNIT/ML 500 UNITS: 100 SOLUTION INTRAVENOUS at 13:07:00

## 2024-02-16 NOTE — PROGRESS NOTES
[facility] Progress Note      Patient Name: Dionne Bello   YOB: 1955  Medical Record Number: [id number]  Consulting Physician: Justyn Zelaya M.D.     The 21st Century Cures Act makes medical notes like these available to patients in the interest of transparency. Please be advised this is a medical document. Medical documents are intended to carry relevant information, facts as evident, and the clinical opinion of the practitioner. The medical note is intended as peer to peer communication and may appear blunt or direct. It is written in medical language and may contain abbreviations or verbiage that are unfamiliar.      Date of Visit: 2/16/2024        Chief Complaint  Metastatic leiomyosarcoma - follow up.    Oncologic History  Dionne Bello is a 68 year old female who was found to have a large pelvic mass on imaging studies performed on 09/16/2014. On 09/18/2014 she underwent exploratory laparotomy, modified radical supracervical hysterectomy and bilateral salping-oophorectomy, and resection of what was described as a retroperitoneal mass. Pathology from the \"retroperitoneal\" mass showed a high grade leiomyosarcoma. Immunohistochemical studies for estrogen receptors were not performed. In the uterus, she was found to have a leiomyoma. From 12/02/2014 to 01/14/2015 she received adjuvant radiation therapy.     On 06/18/2015, she was found to have a scalp mass. Surgical resection showed leiomyosarcoma; immunohistochemical studies for estrogen receptors were not performed.     On 04/08/2019 she underwent biopsy of a left breast mass. Pathology showed invasive mammary carcinoma. Immunohistochemical studies were as follows: estrogen receptor <1%, progesterone receptor 0%, Her2 3+. Her2 amplified by FISH.    Beginning on 04/30/2019, she received neoadjuvant systemic therapy with doxorubicin and cyclophosphamide x 4 cycles. Beginning on 06/24/2019, she received  neoadjuvant systemic therapy with paclitaxel, trastuzumab, and pertuzumab. Paclitaxel was discontinued before completion for peripheral neuropathy. She then continued trastuzumab and pertuzumab.     On 10/09/2019 she underwent left breast lumpectomy with sentinel lymph node biopsy. Pathology showed no residual malignancy in the breast and 2 lymph nodes without metastasis (0/2).    Post-operatively she continued trastuzumab and pertuzumab. From 11/06/2019 to 12/18/2019 she received adjuvant radiation therapy.     On 11/25/2020, she underwent biopsy of a left scalp mass which showed leiomyosarcoma. Immunohistochemical studies for estrogen receptor were not performed.    On 02/09/2021 she underwent scalp debridement and reconstruction with a fasciocutaneous rotational flap.     CT chest, abdomen, pelvis on 05/18/2021 showed increasing size of pulmonary nodules.     On 12/16/2023 she underwent biopsy of a right superior anterior chest wall mass. Pathology showed leiomyosarcoma; immunohistochemical studies for estrogen receptors were negative.     On 02/14/2023 she began therapy with trabectedin. She received a total of 6 cycles of therapy.    On 06/28/2023 she underwent PET/CT scan after she noted an increase in size of the biopsy proven metastatic sarcomatous lesion in the right superior anterior chest wall. Imaging showed increase in size and metabolic activity of the right chest wall mass with involvement of the adjacent sternum and rib; a spiculated mixed solid and groundglass right upper lobe pulmonary nodule with SUV 3.3.    From 07/17/2023 to 08/04/2023 she received radiation therapy to the anterior right chest wall mass.     All of the above was performed at [facility].     On 08/07/2022, patient's tumor from the right chest wall was reviewed at [facility] - tumor was negative for estrogen receptors and had a Ki67 of 40-45%.    [facility] showed the following: mutations in TP53,  RB1, and PTEN; TMB 1.6 m/MB; normal MMR; PDL1 TPS <1% and CPS <1.    From 11/01/2023 to 11/10/2023 she received radiation therapy to the right upper chest.     History of Present Illness  She denies any new self palpated masses. She denies any progressive respiratory symptoms. She has no pain.     Performance Status   Karnofsky 100% - Normal, no complaints.    Past Medical History (historical data, reviewed by physician)   Metastatic leiomyosarcoma (as above); invasive mammary carcinoma, left breast (as above); essential hypertension; COPD.    Past Surgical History (historical data, reviewed by physician)   TAHBSO and resection of \"retroperitoneal\" sarcoma (as above); left breast lumpectomy and sentinel lymph node biopsy; ectopic pregnancy; tonsillectomy and adenoidectomy.     Family History (historical data, reviewed by physician)   Paternal cousin with breast cancer age 40.     Social History (historical data, reviewed by physician)   History of tobacco use.     Current Medications   losartan-hydroCHLOROthiazide 50-12.5 MG Oral Tab Take 1 tablet by mouth daily.      DULoxetine 30 MG Oral Cap DR Particles Take 1 capsule (30 mg total) by mouth 3 (three) times daily.      cyanocobalamin 1000 MCG Oral Tab Take 1 tablet (1,000 mcg total) by mouth daily.      LORazepam 0.5 MG Oral Tab Take 1 tablet (0.5 mg total) by mouth nightly.      ibuprofen 100 MG Oral Tab Take 4 tablets (400 mg total) by mouth.      Cholecalciferol 50 MCG (2000 UT) Oral Tab Take 2 tablets (4,000 Units total) by mouth daily.       Allergies   Ms. Bello is allergic to iopamidol and radiology contrast iodinated dyes.    Vital Signs   /70 (BP Location: Right arm, Patient Position: Sitting, Cuff Size: adult)   Pulse 90   Temp 97.7 °F (36.5 °C) (Oral)   Resp 16   Ht 1.575 m (5' 2\")   Wt 60.2 kg (132 lb 12.8 oz)   SpO2 98%   BMI 24.29 kg/m²     Physical Examination   Constitutional      Well developed, well nourished. Appears close to  chronological age. No apparent distress.   Head   Normocephalic and atraumatic.  Eyes   Conjunctiva clear; sclera anicteric.  ENMT                 External nose normal; external ears normal.  Respiratory          Normal effort; no respiratory distress.  Cardiovascular  Regular rate and rhythm.  Abdomen  No distended.  Extremities  No lower extremity edema.   Neurologic           Motor and sensory grossly intact.  Psychiatric          Mood and affect appropriate.    Laboratory   Recent Results (from the past 48 hour(s))   COMP METABOLIC PANEL [E]    Collection Time: 02/16/24 12:54 PM   Result Value Ref Range    Glucose 88 70 - 99 mg/dL    Sodium 143 136 - 145 mmol/L    Potassium 4.0 3.5 - 5.1 mmol/L    Chloride 109 98 - 112 mmol/L    CO2 28.0 21.0 - 32.0 mmol/L    Anion Gap 6 0 - 18 mmol/L    BUN 12 9 - 23 mg/dL    Creatinine 0.74 0.55 - 1.02 mg/dL    BUN/CREA Ratio 16.2 10.0 - 20.0    Calcium, Total 9.5 8.7 - 10.4 mg/dL    Calculated Osmolality 295 275 - 295 mOsm/kg    eGFR-Cr 88 >=60 mL/min/1.73m2    ALT 13 10 - 49 U/L    AST 23 <=34 U/L    Alkaline Phosphatase 83 55 - 142 U/L    Bilirubin, Total 0.3 0.2 - 1.1 mg/dL    Total Protein 7.1 5.7 - 8.2 g/dL    Albumin 4.6 3.2 - 4.8 g/dL    Globulin  2.5 (L) 2.8 - 4.4 g/dL    A/G Ratio 1.8 1.0 - 2.0    Patient Fasting for CMP? Patient not present    MAGNESIUM [E]    Collection Time: 02/16/24 12:54 PM   Result Value Ref Range    Magnesium 1.9 1.6 - 2.6 mg/dL   PHOSPHORUS [E]    Collection Time: 02/16/24 12:54 PM   Result Value Ref Range    Phosphorus 3.3 2.4 - 5.1 mg/dL   CBC W/ DIFFERENTIAL    Collection Time: 02/16/24 12:54 PM   Result Value Ref Range    WBC 4.8 4.0 - 11.0 x10(3) uL    RBC 4.11 3.80 - 5.30 x10(6)uL    HGB 14.0 12.0 - 16.0 g/dL    HCT 40.1 35.0 - 48.0 %    MCV 97.6 80.0 - 100.0 fL    MCH 34.1 (H) 26.0 - 34.0 pg    MCHC 34.9 31.0 - 37.0 g/dL    RDW-SD 45.6 35.1 - 46.3 fL    RDW 12.8 11.0 - 15.0 %    .0 150.0 - 450.0 10(3)uL    Neutrophil Absolute  Prelim 3.01 1.50 - 7.70 x10 (3) uL    Neutrophil Absolute 3.01 1.50 - 7.70 x10(3) uL    Lymphocyte Absolute 1.10 1.00 - 4.00 x10(3) uL    Monocyte Absolute 0.56 0.10 - 1.00 x10(3) uL    Eosinophil Absolute 0.06 0.00 - 0.70 x10(3) uL    Basophil Absolute 0.03 0.00 - 0.20 x10(3) uL    Immature Granulocyte Absolute 0.01 0.00 - 1.00 x10(3) uL    Neutrophil % 63.1 %    Lymphocyte % 23.1 %    Monocyte % 11.7 %    Eosinophil % 1.3 %    Basophil % 0.6 %    Immature Granulocyte % 0.2 %     Radiology  02/09/2024:  CT chest w contrast - I independently visualized the radiologic images in addition to reviewing the written report: There is nodularity in the right upper lobe which is not significantly changed as compared to PET on 09/01/2023 at which time the lesion had an SUV of 3.3 In comparison with previous PET/CT, there are multiple subcentimeter nodules in the lung bilaterally.    Impression and Plan   1.   Metastatic leiomyosarcoma: Recent CT shows multiple subcentimeter nodules not visible on the previous PET scan. It is not clear if these are indeed new nodules or simply not visualized previously because of the resolution of the PET scan. I recommend that she proceed with the previously ordered PET/CT. If there is no evidence of disease outside of the lung, I will recommend repeating CT chest in 2 months.     Patient will continue to receive longitudinal care by me for the complex care required for the cancer diagnosis including the expected complications related to anticancer therapy.     Planned Follow Up   Patient will be contacted with her PET results; appropriate follow up will be arrange for at that time.     Electronically signed by:    Justyn Zelaya M.D.  System Medical Director of Oncology Services  [facility]

## 2024-02-28 ENCOUNTER — HOSPITAL ENCOUNTER (OUTPATIENT)
Dept: NUCLEAR MEDICINE | Facility: HOSPITAL | Age: 69
Discharge: HOME | End: 2024-02-28
Attending: SPECIALIST
Payer: MEDICARE

## 2024-02-28 DIAGNOSIS — C49.9 LEIOMYOSARCOMA (HCC): ICD-10-CM

## 2024-02-28 LAB — GLUCOSE BLDC GLUCOMTR-MCNC: 85 MG/DL (ref 70–99)

## 2024-02-28 PROCEDURE — 78815 PET IMAGE W/CT SKULL-THIGH: CPT | Performed by: SPECIALIST

## 2024-02-28 PROCEDURE — 82962 GLUCOSE BLOOD TEST: CPT

## 2024-03-01 DIAGNOSIS — C49.9 LEIOMYOSARCOMA (HCC): Primary | ICD-10-CM

## 2024-03-01 DIAGNOSIS — C49.9 SECONDARY SARCOMA OF LUNG, UNSPECIFIED LATERALITY (HCC): ICD-10-CM

## 2024-03-01 DIAGNOSIS — C78.00 SECONDARY SARCOMA OF LUNG, UNSPECIFIED LATERALITY (HCC): ICD-10-CM

## 2024-03-05 ENCOUNTER — TELEPHONE (OUTPATIENT)
Dept: HEMATOLOGY/ONCOLOGY | Facility: HOSPITAL | Age: 69
End: 2024-03-05

## 2024-03-05 NOTE — TELEPHONE ENCOUNTER
Called and spoke with patient- canceled appointments with Dr. Zelaya for 3/22.  Patient to have CT in 2 months with follow up and port labs after CT. She will call and schedule CT and call us to schedule Dr. Zelaya appointment after.

## 2024-03-22 ENCOUNTER — APPOINTMENT (OUTPATIENT)
Dept: HEMATOLOGY/ONCOLOGY | Facility: HOSPITAL | Age: 69
End: 2024-03-22
Attending: SPECIALIST
Payer: MEDICARE

## 2024-03-22 ENCOUNTER — TELEPHONE (OUTPATIENT)
Dept: HEMATOLOGY/ONCOLOGY | Facility: HOSPITAL | Age: 69
End: 2024-03-22

## 2024-03-22 NOTE — TELEPHONE ENCOUNTER
SRINIM to schedule a follow up in 2 months to see Dr. Zelaya. She need to have a CT prior to the appointment, Called 3/22/24 gr

## 2024-03-25 ENCOUNTER — APPOINTMENT (OUTPATIENT)
Dept: HEMATOLOGY/ONCOLOGY | Facility: HOSPITAL | Age: 69
End: 2024-03-25
Attending: SPECIALIST
Payer: MEDICARE

## 2024-03-25 ENCOUNTER — TELEPHONE (OUTPATIENT)
Dept: HEMATOLOGY/ONCOLOGY | Facility: HOSPITAL | Age: 69
End: 2024-03-25

## 2024-03-25 NOTE — TELEPHONE ENCOUNTER
ADRIANA to schedule an appointment to see Dr. Zelaya on 4/8/24 at 245 pm. Called 3/25/24. Per Adelita TURK

## 2024-04-02 ENCOUNTER — HOSPITAL ENCOUNTER (OUTPATIENT)
Dept: CT IMAGING | Facility: HOSPITAL | Age: 69
Discharge: HOME | End: 2024-04-02
Attending: SPECIALIST
Payer: MEDICARE

## 2024-04-02 DIAGNOSIS — C49.9 LEIOMYOSARCOMA (HCC): ICD-10-CM

## 2024-04-02 DIAGNOSIS — C49.9 SECONDARY SARCOMA OF LUNG, UNSPECIFIED LATERALITY (HCC): ICD-10-CM

## 2024-04-02 DIAGNOSIS — C78.00 SECONDARY SARCOMA OF LUNG, UNSPECIFIED LATERALITY (HCC): ICD-10-CM

## 2024-04-02 PROCEDURE — 71250 CT THORAX DX C-: CPT | Performed by: SPECIALIST

## 2024-04-07 NOTE — PROGRESS NOTES
[facility] Hematology Oncology Group Progress Note      Patient Name: Dionne Bello   YOB: 1955  Medical Record Number: [id number]  Consulting Physician: Justyn Zelaya M.D.     The 21st Century Cures Act makes medical notes like these available to patients in the interest of transparency. Please be advised this is a medical document. Medical documents are intended to carry relevant information, facts as evident, and the clinical opinion of the practitioner. The medical note is intended as peer to peer communication and may appear blunt or direct. It is written in medical language and may contain abbreviations or verbiage that are unfamiliar.      Date of Visit: 4/8/2024       Chief Complaint  Metastatic leiomyosarcoma - follow up.    Oncologic History  Stefany Bello is a 68 year old female who was found to have a large pelvic mass on imaging studies performed on 09/16/2014. On 09/18/2014 she underwent exploratory laparotomy, modified radical supracervical hysterectomy and bilateral salping-oophorectomy, and resection of what was described as a retroperitoneal mass. Pathology from the \"retroperitoneal\" mass showed a high grade leiomyosarcoma. Immunohistochemical studies for estrogen receptors were not performed. In the uterus, she was found to have a leiomyoma. From 12/02/2014 to 01/14/2015 she received adjuvant radiation therapy.     On 06/18/2015, she was found to have a scalp mass. Surgical resection showed leiomyosarcoma; immunohistochemical studies for estrogen receptors were not performed.     On 04/08/2019 she underwent biopsy of a left breast mass. Pathology showed invasive mammary carcinoma. Immunohistochemical studies were as follows: estrogen receptor <1%, progesterone receptor 0%, Her2 3+. Her2 amplified by FISH.    Beginning on 04/30/2019, she received neoadjuvant systemic therapy with doxorubicin and cyclophosphamide x 4 cycles. Beginning on 06/24/2019, she received  neoadjuvant systemic therapy with paclitaxel, trastuzumab, and pertuzumab. Paclitaxel was discontinued before completion for peripheral neuropathy. She then continued trastuzumab and pertuzumab.     On 10/09/2019 she underwent left breast lumpectomy with sentinel lymph node biopsy. Pathology showed no residual malignancy in the breast and 2 lymph nodes without metastasis (0/2).    Post-operatively she continued trastuzumab and pertuzumab. From 11/06/2019 to 12/18/2019 she received adjuvant radiation therapy.     On 11/25/2020, she underwent biopsy of a left scalp mass which showed leiomyosarcoma. Immunohistochemical studies for estrogen receptor were not performed.    On 02/09/2021 she underwent scalp debridement and reconstruction with a fasciocutaneous rotational flap.     CT chest, abdomen, pelvis on 05/18/2021 showed increasing size of pulmonary nodules.     On 12/16/2023 she underwent biopsy of a right superior anterior chest wall mass. Pathology showed leiomyosarcoma; immunohistochemical studies for estrogen receptors were negative.     On 02/14/2023 she began therapy with trabectedin. She received a total of 6 cycles of therapy.    On 06/28/2023 she underwent PET/CT scan after she noted an increase in size of the biopsy proven metastatic sarcomatous lesion in the right superior anterior chest wall. Imaging showed increase in size and metabolic activity of the right chest wall mass with involvement of the adjacent sternum and rib; a spiculated mixed solid and groundglass right upper lobe pulmonary nodule with SUV 3.3.    From 07/17/2023 to 08/04/2023 she received radiation therapy to the anterior right chest wall mass.     All of the above was performed at [facility].     On 08/07/2022, patient's tumor from the right chest wall was reviewed at [facility] - tumor was negative for estrogen receptors and had a Ki67 of 40-45%.    [facility] showed the following: mutations in TP53,  RB1, and PTEN; TMB 1.6 m/MB; normal MMR; PDL1 TPS <1% and CPS <1.    From 11/01/2023 to 11/10/2023 she received radiation therapy to the right upper chest.     History of Present Illness  She denies any new self palpated masses. She denies any progressive respiratory symptoms. She has no pain.     Performance Status   Karnofsky 100% - Normal, no complaints.    Past Medical History (historical data, reviewed by physician)   Metastatic leiomyosarcoma (as above); invasive mammary carcinoma, left breast (as above); essential hypertension; COPD.    Past Surgical History (historical data, reviewed by physician)   TAHBSO and resection of \"retroperitoneal\" sarcoma (as above); left breast lumpectomy and sentinel lymph node biopsy; ectopic pregnancy; tonsillectomy and adenoidectomy.     Family History (historical data, reviewed by physician)   Paternal cousin with breast cancer age 40.     Social History (historical data, reviewed by physician)   History of tobacco use.     Current Medications   losartan-hydroCHLOROthiazide 50-12.5 MG Oral Tab Take 1 tablet by mouth daily.      DULoxetine 30 MG Oral Cap DR Particles Take 1 capsule (30 mg total) by mouth 3 (three) times daily.      cyanocobalamin 1000 MCG Oral Tab Take 1 tablet (1,000 mcg total) by mouth daily.      LORazepam 0.5 MG Oral Tab Take 1 tablet (0.5 mg total) by mouth nightly.      ibuprofen 100 MG Oral Tab Take 4 tablets (400 mg total) by mouth.      Cholecalciferol 50 MCG (2000 UT) Oral Tab Take 2 tablets (4,000 Units total) by mouth daily.       Allergies   Ms. Bello is allergic to iopamidol and radiology contrast iodinated dyes.    Vital Signs   /56 (BP Location: Right arm, Patient Position: Sitting, Cuff Size: adult)   Pulse 66   Temp 98 °F (36.7 °C) (Oral)   Resp 16   Ht 1.575 m (5' 2\")   Wt 62.1 kg (137 lb)   SpO2 99%   BMI 25.06 kg/m²     Physical Examination   Constitutional      Well developed, well nourished. Appears close to chronological  age. No apparent distress.   Head   Normocephalic and atraumatic.  Eyes   Conjunctiva clear; sclera anicteric.  ENMT                 External nose normal; external ears normal.  Respiratory          Normal effort; no respiratory distress.  Cardiovascular  Regular rate and rhythm.  Abdomen  No distended.  Extremities  No lower extremity edema.   Neurologic           Motor and sensory grossly intact.  Psychiatric          Mood and affect appropriate.    Laboratory   No results found for this or any previous visit (from the past 48 hour(s)).    Radiology  04/02/2024:  CT chest wo contrast - I independently visualized the radiologic images in addition to reviewing the written report: There are multiple nodules in the right upper lobe which were not apparent on last CT imaging. However, some of the previously established nodules have decreased in size. All are subcentimeter in size.     Impression and Plan   1.   Metastatic leiomyosarcoma: CT imaging shows multiple subcentimeter nodules in the right upper lobe. The etiology of these nodules is uncertain. It would be rather unusual for metastatic disease to manifest itself in only one lobe of the lung. At least some portion of this lobe would have been in her most recent radiation field. She has no symptoms to suggest active infection.           As the nodules are all subcentimeter, biopsy is not possible. I recommend a course of close observation with repeat CT imaging in 2 months.    2.   Port-a-cath: It will be flushed when she returns in 2 months.    Patient will continue to receive longitudinal care by me for the complex care required for the cancer diagnosis including the expected complications related to anticancer therapy.     Planned Follow Up   Patient will return for follow up in 2 months.    Electronically signed by:    Justyn Zelyaa M.D.  System Medical Director of Oncology Services  [facility]

## 2024-04-08 ENCOUNTER — OFFICE VISIT (OUTPATIENT)
Dept: HEMATOLOGY/ONCOLOGY | Facility: HOSPITAL | Age: 69
End: 2024-04-08
Attending: SPECIALIST
Payer: MEDICARE

## 2024-04-08 VITALS
HEART RATE: 66 BPM | DIASTOLIC BLOOD PRESSURE: 56 MMHG | SYSTOLIC BLOOD PRESSURE: 121 MMHG | HEIGHT: 62 IN | WEIGHT: 137 LBS | RESPIRATION RATE: 16 BRPM | BODY MASS INDEX: 25.21 KG/M2 | TEMPERATURE: 98 F | OXYGEN SATURATION: 99 %

## 2024-04-08 DIAGNOSIS — C49.9 LEIOMYOSARCOMA (HCC): Primary | ICD-10-CM

## 2024-04-08 DIAGNOSIS — R91.8 PULMONARY NODULES: ICD-10-CM

## 2024-04-08 DIAGNOSIS — C78.01 SECONDARY SARCOMA OF RIGHT LUNG (HCC): ICD-10-CM

## 2024-04-08 PROCEDURE — 99214 OFFICE O/P EST MOD 30 MIN: CPT | Performed by: SPECIALIST

## 2024-05-10 ENCOUNTER — TELEPHONE (OUTPATIENT)
Dept: HEMATOLOGY/ONCOLOGY | Facility: HOSPITAL | Age: 69
End: 2024-05-10

## 2024-05-10 NOTE — TELEPHONE ENCOUNTER
Pt is calling and requesting that the CT order be corrected to be done in [place]. She was unable to make the appt due to the order saying Shoaib/Ayala

## 2024-05-10 NOTE — TELEPHONE ENCOUNTER
Called central scheduling and they were able to schedule the patient for [place]- Central scheduling called patient and patient rescheduled for [place].

## 2024-06-03 ENCOUNTER — HOSPITAL ENCOUNTER (OUTPATIENT)
Dept: CT IMAGING | Facility: HOSPITAL | Age: 69
Discharge: HOME | End: 2024-06-03
Attending: SPECIALIST
Payer: MEDICARE

## 2024-06-03 DIAGNOSIS — C49.9 LEIOMYOSARCOMA (HCC): ICD-10-CM

## 2024-06-03 PROCEDURE — 71250 CT THORAX DX C-: CPT | Performed by: SPECIALIST

## 2024-06-09 NOTE — PROGRESS NOTES
[facility] Hematology Oncology Group Progress Note      Patient Name: Dionne Bello   YOB: 1955  Medical Record Number: [id number]  Consulting Physician: Justyn Zelaya M.D.     The 21st Century Cures Act makes medical notes like these available to patients in the interest of transparency. Please be advised this is a medical document. Medical documents are intended to carry relevant information, facts as evident, and the clinical opinion of the practitioner. The medical note is intended as peer to peer communication and may appear blunt or direct. It is written in medical language and may contain abbreviations or verbiage that are unfamiliar.      Date of Visit: 6/10/2024       Chief Complaint  Metastatic leiomyosarcoma - follow up.    Oncologic History  Stefany Bello is a 68 year old female who was found to have a large pelvic mass on imaging studies performed on 09/16/2014. On 09/18/2014 she underwent exploratory laparotomy, modified radical supracervical hysterectomy and bilateral salping-oophorectomy, and resection of what was described as a retroperitoneal mass. Pathology from the \"retroperitoneal\" mass showed a high grade leiomyosarcoma. Immunohistochemical studies for estrogen receptors were not performed. In the uterus, she was found to have a leiomyoma. From 12/02/2014 to 01/14/2015 she received adjuvant radiation therapy.     On 06/18/2015, she was found to have a scalp mass. Surgical resection showed leiomyosarcoma; immunohistochemical studies for estrogen receptors were not performed.     On 04/08/2019 she underwent biopsy of a left breast mass. Pathology showed invasive mammary carcinoma. Immunohistochemical studies were as follows: estrogen receptor <1%, progesterone receptor 0%, Her2 3+. Her2 amplified by FISH.    Beginning on 04/30/2019, she received neoadjuvant systemic therapy with doxorubicin and cyclophosphamide x 4 cycles. Beginning on 06/24/2019, she  received neoadjuvant systemic therapy with paclitaxel, trastuzumab, and pertuzumab. Paclitaxel was discontinued before completion for peripheral neuropathy. She then continued trastuzumab and pertuzumab.     On 10/09/2019 she underwent left breast lumpectomy with sentinel lymph node biopsy. Pathology showed no residual malignancy in the breast and 2 lymph nodes without metastasis (0/2).    Post-operatively she continued trastuzumab and pertuzumab. From 11/06/2019 to 12/18/2019 she received adjuvant radiation therapy.     On 11/25/2020, she underwent biopsy of a left scalp mass which showed leiomyosarcoma. Immunohistochemical studies for estrogen receptor were not performed.    On 02/09/2021 she underwent scalp debridement and reconstruction with a fasciocutaneous rotational flap.     CT chest, abdomen, pelvis on 05/18/2021 showed increasing size of pulmonary nodules.     On 12/16/2023 she underwent biopsy of a right superior anterior chest wall mass. Pathology showed leiomyosarcoma; immunohistochemical studies for estrogen receptors were negative.     On 02/14/2023 she began therapy with trabectedin. She received a total of 6 cycles of therapy.    On 06/28/2023 she underwent PET/CT scan after she noted an increase in size of the biopsy proven metastatic sarcomatous lesion in the right superior anterior chest wall. Imaging showed increase in size and metabolic activity of the right chest wall mass with involvement of the adjacent sternum and rib; a spiculated mixed solid and groundglass right upper lobe pulmonary nodule with SUV 3.3.    From 07/17/2023 to 08/04/2023 she received radiation therapy to the anterior right chest wall mass.     All of the above was performed at [facility].     On 08/07/2022, patient's tumor from the right chest wall was reviewed at [facility] - tumor was negative for estrogen receptors and had a Ki67 of 40-45%.    Tumor testing through [facility] showed the following: mutations  in TP53, RB1, and PTEN; TMB 1.6 m/MB; normal MMR; PDL1 TPS <1% and CPS <1.    From 11/01/2023 to 11/10/2023 she received radiation therapy to the right upper chest.     History of Present Illness  She denies any new self palpated masses. She denies any progressive respiratory symptoms. She has no pain.     Performance Status   Karnofsky 100% - Normal, no complaints.    Past Medical History (historical data, reviewed by physician)   Metastatic leiomyosarcoma (as above); invasive mammary carcinoma, left breast (as above); essential hypertension; COPD.    Past Surgical History (historical data, reviewed by physician)   TAHBSO and resection of \"retroperitoneal\" sarcoma (as above); left breast lumpectomy and sentinel lymph node biopsy; ectopic pregnancy; tonsillectomy and adenoidectomy.     Family History (historical data, reviewed by physician)   Paternal cousin with breast cancer age 40.     Social History (historical data, reviewed by physician)   History of tobacco use.     Current Medications   losartan-hydroCHLOROthiazide 50-12.5 MG Oral Tab Take 1 tablet by mouth daily.      DULoxetine 30 MG Oral Cap DR Particles Take 1 capsule (30 mg total) by mouth 3 (three) times daily.      cyanocobalamin 1000 MCG Oral Tab Take 1 tablet (1,000 mcg total) by mouth daily.      LORazepam 0.5 MG Oral Tab Take 1 tablet (0.5 mg total) by mouth nightly.      ibuprofen 100 MG Oral Tab Take 4 tablets (400 mg total) by mouth.      Cholecalciferol 50 MCG (2000 UT) Oral Tab Take 2 tablets (4,000 Units total) by mouth daily.       Allergies   Ms. Bello is allergic to iopamidol and radiology contrast iodinated dyes.    Vital Signs   /82 (BP Location: Left arm, Patient Position: Sitting, Cuff Size: adult)   Pulse 68   Temp 98.5 °F (36.9 °C) (Oral)   Resp 16   Ht 1.575 m (5' 2\")   Wt 61.7 kg (136 lb)   SpO2 98%   BMI 24.87 kg/m²     Physical Examination   Constitutional      Well developed, well nourished. Appears close to  chronological age. No apparent distress.   Head   Normocephalic and atraumatic.  Eyes   Conjunctiva clear; sclera anicteric.  ENMT                 External nose normal; external ears normal.  Neck   Supple; no masses.  Lymphatics  No cervical, supraclavicular, axillary adenopathy.  Respiratory          Normal effort; no respiratory distress; clear to auscultation bilaterally.  Cardiovascular  Regular rate and rhythm; normal S1S2.  Abdomen  Soft; not tender; no masses; no hepatosplenomegaly.  Extremities  No lower extremity edema.   Neurologic           Motor and sensory grossly intact.  Psychiatric          Mood and affect appropriate.    Laboratory   No results found for this or any previous visit (from the past 48 hour(s)).    Radiology  06/03/2024:  CT chest wo contrast - I independently visualized the radiologic images in addition to reviewing the written report: There is no evidence of progressive disease in the lungs. The right chest wall mass is unchanged in appearance.     Impression and Plan   1.   Metastatic leiomyosarcoma: There is no definitive evidence of progressive disease in the chest. I recommend a continued course of observation with PET/CT scan in 3 months.    2.   Port-a-cath: Flushed today. It will be flushed again when she returns in 3 months.    Patient will continue to receive longitudinal care by me for the complex care required for the cancer diagnosis.    Planned Follow Up   Patient will return for follow up in 3 months.    Electronically signed by:    Justyn Zelaya M.D.  System Medical Director of Oncology Services  [facility]

## 2024-06-10 ENCOUNTER — OFFICE VISIT (OUTPATIENT)
Dept: HEMATOLOGY/ONCOLOGY | Facility: HOSPITAL | Age: 69
End: 2024-06-10
Attending: SPECIALIST
Payer: MEDICARE

## 2024-06-10 VITALS
RESPIRATION RATE: 16 BRPM | HEIGHT: 62 IN | HEART RATE: 68 BPM | DIASTOLIC BLOOD PRESSURE: 82 MMHG | BODY MASS INDEX: 25.03 KG/M2 | OXYGEN SATURATION: 98 % | SYSTOLIC BLOOD PRESSURE: 116 MMHG | WEIGHT: 136 LBS | TEMPERATURE: 99 F

## 2024-06-10 DIAGNOSIS — C78.00 SECONDARY SARCOMA OF LUNG, UNSPECIFIED LATERALITY (HCC): ICD-10-CM

## 2024-06-10 DIAGNOSIS — C78.01 SECONDARY SARCOMA OF RIGHT LUNG (HCC): ICD-10-CM

## 2024-06-10 DIAGNOSIS — C49.9 LEIOMYOSARCOMA (HCC): Primary | ICD-10-CM

## 2024-06-10 DIAGNOSIS — Z45.2 ENCOUNTER FOR CARE RELATED TO PORT-A-CATH: Primary | ICD-10-CM

## 2024-06-10 DIAGNOSIS — C49.9 SECONDARY SARCOMA OF LUNG, UNSPECIFIED LATERALITY (HCC): ICD-10-CM

## 2024-06-10 DIAGNOSIS — C78.01 MALIGNANT NEOPLASM METASTATIC TO RIGHT LUNG (HCC): ICD-10-CM

## 2024-06-10 PROCEDURE — 96523 IRRIG DRUG DELIVERY DEVICE: CPT

## 2024-06-10 PROCEDURE — 99214 OFFICE O/P EST MOD 30 MIN: CPT | Performed by: SPECIALIST

## 2024-06-10 RX ORDER — SODIUM CHLORIDE 9 MG/ML
10 INJECTION, SOLUTION INTRAMUSCULAR; INTRAVENOUS; SUBCUTANEOUS ONCE
OUTPATIENT
Start: 2024-06-10

## 2024-06-10 RX ORDER — HEPARIN SODIUM (PORCINE) LOCK FLUSH IV SOLN 100 UNIT/ML 100 UNIT/ML
5 SOLUTION INTRAVENOUS ONCE
OUTPATIENT
Start: 2024-06-10

## 2024-06-10 RX ORDER — HEPARIN SODIUM (PORCINE) LOCK FLUSH IV SOLN 100 UNIT/ML 100 UNIT/ML
5 SOLUTION INTRAVENOUS ONCE
Status: COMPLETED | OUTPATIENT
Start: 2024-06-10 | End: 2024-06-10

## 2024-06-10 RX ADMIN — HEPARIN SODIUM (PORCINE) LOCK FLUSH IV SOLN 100 UNIT/ML 500 UNITS: 100 SOLUTION INTRAVENOUS at 12:16:00

## 2024-09-03 ENCOUNTER — HOSPITAL ENCOUNTER (OUTPATIENT)
Dept: NUCLEAR MEDICINE | Facility: HOSPITAL | Age: 69
Discharge: HOME | End: 2024-09-03
Attending: SPECIALIST
Payer: MEDICARE

## 2024-09-03 DIAGNOSIS — C49.9 SECONDARY SARCOMA OF LUNG, UNSPECIFIED LATERALITY (HCC): ICD-10-CM

## 2024-09-03 DIAGNOSIS — C49.9 LEIOMYOSARCOMA (HCC): ICD-10-CM

## 2024-09-03 DIAGNOSIS — C78.00 SECONDARY SARCOMA OF LUNG, UNSPECIFIED LATERALITY (HCC): ICD-10-CM

## 2024-09-03 DIAGNOSIS — C78.01 MALIGNANT NEOPLASM METASTATIC TO RIGHT LUNG (HCC): ICD-10-CM

## 2024-09-03 DIAGNOSIS — C78.01 SECONDARY SARCOMA OF RIGHT LUNG (HCC): ICD-10-CM

## 2024-09-03 LAB — GLUCOSE BLDC GLUCOMTR-MCNC: 100 MG/DL (ref 70–99)

## 2024-09-03 PROCEDURE — 82962 GLUCOSE BLOOD TEST: CPT

## 2024-09-03 PROCEDURE — 78815 PET IMAGE W/CT SKULL-THIGH: CPT | Performed by: SPECIALIST

## 2024-09-08 NOTE — PROGRESS NOTES
[facility] Hematology Oncology Group Progress Note      Patient Name: Dionne Bello   YOB: 1955  Medical Record Number: [id number]  Consulting Physician: Justyn Zelaya M.D.     The 21st Century Cures Act makes medical notes like these available to patients in the interest of transparency. Please be advised this is a medical document. Medical documents are intended to carry relevant information, facts as evident, and the clinical opinion of the practitioner. The medical note is intended as peer to peer communication and may appear blunt or direct. It is written in medical language and may contain abbreviations or verbiage that are unfamiliar.      Date of Visit: 9/9/2024       Chief Complaint  Metastatic leiomyosarcoma - follow up.    Oncologic History  Stefany Bello is a 69 year old female who was found to have a large pelvic mass on imaging studies performed on 09/16/2014. On 09/18/2014 she underwent exploratory laparotomy, modified radical supracervical hysterectomy and bilateral salping-oophorectomy, and resection of what was described as a retroperitoneal mass. Pathology from the \"retroperitoneal\" mass showed a high grade leiomyosarcoma. Immunohistochemical studies for estrogen receptors were not performed. In the uterus, she was found to have a leiomyoma. From 12/02/2014 to 01/14/2015 she received adjuvant radiation therapy.     On 06/18/2015, she was found to have a scalp mass. Surgical resection showed leiomyosarcoma; immunohistochemical studies for estrogen receptors were not performed.     On 04/08/2019 she underwent biopsy of a left breast mass. Pathology showed invasive mammary carcinoma. Immunohistochemical studies were as follows: estrogen receptor <1%, progesterone receptor 0%, Her2 3+. Her2 amplified by FISH.    Beginning on 04/30/2019, she received neoadjuvant systemic therapy with doxorubicin and cyclophosphamide x 4 cycles. Beginning on 06/24/2019, she received  neoadjuvant systemic therapy with paclitaxel, trastuzumab, and pertuzumab. Paclitaxel was discontinued before completion for peripheral neuropathy. She then continued trastuzumab and pertuzumab.     On 10/09/2019 she underwent left breast lumpectomy with sentinel lymph node biopsy. Pathology showed no residual malignancy in the breast and 2 lymph nodes without metastasis (0/2).    Post-operatively she continued trastuzumab and pertuzumab. From 11/06/2019 to 12/18/2019 she received adjuvant radiation therapy.     On 11/25/2020, she underwent biopsy of a left scalp mass which showed leiomyosarcoma. Immunohistochemical studies for estrogen receptor were not performed.    On 02/09/2021 she underwent scalp debridement and reconstruction with a fasciocutaneous rotational flap.     CT chest, abdomen, pelvis on 05/18/2021 showed increasing size of pulmonary nodules.     On 12/16/2023 she underwent biopsy of a right superior anterior chest wall mass. Pathology showed leiomyosarcoma; immunohistochemical studies for estrogen receptors were negative.     On 02/14/2023 she began therapy with trabectedin. She received a total of 6 cycles of therapy.    On 06/28/2023 she underwent PET/CT scan after she noted an increase in size of the biopsy proven metastatic sarcomatous lesion in the right superior anterior chest wall. Imaging showed increase in size and metabolic activity of the right chest wall mass with involvement of the adjacent sternum and rib; a spiculated mixed solid and groundglass right upper lobe pulmonary nodule with SUV 3.3.    From 07/17/2023 to 08/04/2023 she received radiation therapy to the anterior right chest wall mass.     All of the above was performed at [facility].     On 08/07/2022, patient's tumor from the right chest wall was reviewed at [facility] - tumor was negative for estrogen receptors and had a Ki67 of 40-45%.    Tumor testing through [facility] showed the following: mutations in TP53,  RB1, and PTEN; TMB 1.6 m/MB; normal MMR; PDL1 TPS <1% and CPS <1.    From 11/01/2023 to 11/10/2023 she received radiation therapy to the right upper chest.     History of Present Illness  She denies any new self palpated masses. She denies any progressive respiratory symptoms. She has no pain.     Performance Status   Karnofsky 100% - Normal, no complaints.    Past Medical History (historical data, reviewed by physician)   Metastatic leiomyosarcoma (as above); invasive mammary carcinoma, left breast (as above); essential hypertension; COPD.    Past Surgical History (historical data, reviewed by physician)   TAHBSO and resection of \"retroperitoneal\" sarcoma (as above); left breast lumpectomy and sentinel lymph node biopsy; ectopic pregnancy; tonsillectomy and adenoidectomy.     Family History (historical data, reviewed by physician)   Paternal cousin with breast cancer age 40.     Social History (historical data, reviewed by physician)   History of tobacco use.     Current Medications   losartan-hydroCHLOROthiazide 50-12.5 MG Oral Tab Take 1 tablet by mouth daily.      DULoxetine 30 MG Oral Cap DR Particles Take 1 capsule (30 mg total) by mouth 3 (three) times daily.      cyanocobalamin 1000 MCG Oral Tab Take 1 tablet (1,000 mcg total) by mouth daily.      LORazepam 0.5 MG Oral Tab Take 1 tablet (0.5 mg total) by mouth nightly.      ibuprofen 100 MG Oral Tab Take 4 tablets (400 mg total) by mouth.      Cholecalciferol 50 MCG (2000 UT) Oral Tab Take 2 tablets (4,000 Units total) by mouth daily.       Allergies   Ms. Bello is allergic to iopamidol and radiology contrast iodinated dyes.    Vital Signs   /79 (BP Location: Left arm, Patient Position: Sitting, Cuff Size: adult)   Pulse 65   Temp 97.8 °F (36.6 °C) (Oral)   Resp 18   Ht 1.575 m (5' 2\")   Wt 62.5 kg (137 lb 12.8 oz)   SpO2 98%   BMI 25.20 kg/m²     Physical Examination   Constitutional      Well developed, well nourished. Appears close to  chronological age. No apparent distress.   Head   Normocephalic and atraumatic.  Eyes   Conjunctiva clear; sclera anicteric.  ENMT                 External nose normal; external ears normal.  Neck   Supple; no masses.  Chest   Stable superior anterior chest wall mass.   Lymphatics  No cervical, supraclavicular, axillary adenopathy.  Respiratory          Normal effort; no respiratory distress; clear to auscultation bilaterally.  Cardiovascular  Regular rate and rhythm; normal S1S2.  Abdomen  Soft; not tender; no masses; no hepatosplenomegaly.  Extremities  No lower extremity edema.   Neurologic           Motor and sensory grossly intact.  Psychiatric          Mood and affect appropriate.    Laboratory   No results found for this or any previous visit (from the past 48 hour(s)).    Radiology  09/03/2024:  CT chest wo contrast - I independently visualized the radiologic images in addition to reviewing the written report: There is no evidence of progressive disease in the lungs. The right chest wall mass is unchanged in appearance. There is stable area of uptake in the medial aspect of the right lung which may reflect radiation changes. There is punctate focus of uptake in the right lower lobe which is stable as compared to the previous scan.    Impression and Plan   1.   Metastatic leiomyosarcoma: There is no definitive evidence of progressive disease in the chest. I recommend a continued course of observation with PET/CT scan in 3 months.    2.   Port-a-cath: Flushed today. It will be flushed again when she returns in 3 months.    Patient will continue to receive longitudinal care by me for the complex care required for the cancer diagnosis.    Planned Follow Up   Patient will return for follow up in 3 months.    Electronically signed by:    Justyn Zelaya M.D.  [facility] Medical Director of Oncology Services  [facility]

## 2024-09-09 ENCOUNTER — OFFICE VISIT (OUTPATIENT)
Dept: HEMATOLOGY/ONCOLOGY | Facility: HOSPITAL | Age: 69
End: 2024-09-09
Attending: SPECIALIST
Payer: MEDICARE

## 2024-09-09 VITALS
OXYGEN SATURATION: 98 % | HEART RATE: 65 BPM | SYSTOLIC BLOOD PRESSURE: 153 MMHG | TEMPERATURE: 98 F | RESPIRATION RATE: 18 BRPM | HEIGHT: 62 IN | DIASTOLIC BLOOD PRESSURE: 79 MMHG | WEIGHT: 137.81 LBS | BODY MASS INDEX: 25.36 KG/M2

## 2024-09-09 DIAGNOSIS — C78.01 MALIGNANT NEOPLASM METASTATIC TO RIGHT LUNG (HCC): ICD-10-CM

## 2024-09-09 DIAGNOSIS — C49.9 LEIOMYOSARCOMA (HCC): ICD-10-CM

## 2024-09-09 DIAGNOSIS — C78.00 SECONDARY SARCOMA OF LUNG, UNSPECIFIED LATERALITY (HCC): ICD-10-CM

## 2024-09-09 DIAGNOSIS — C78.01 SECONDARY SARCOMA OF RIGHT LUNG (HCC): Primary | ICD-10-CM

## 2024-09-09 DIAGNOSIS — C49.9 SECONDARY SARCOMA OF LUNG, UNSPECIFIED LATERALITY (HCC): ICD-10-CM

## 2024-09-09 PROCEDURE — 96523 IRRIG DRUG DELIVERY DEVICE: CPT

## 2024-09-09 PROCEDURE — 99214 OFFICE O/P EST MOD 30 MIN: CPT | Performed by: SPECIALIST

## 2024-09-09 PROCEDURE — G2211 COMPLEX E/M VISIT ADD ON: HCPCS | Performed by: SPECIALIST

## 2024-11-29 NOTE — PROGRESS NOTES
[facility] Progress Note       Patient Name: Dionne Bello   YOB: 1955  Medical Record Number: [id number]  Consulting Physician: Justyn Zelaya M.D.     The 21st Century Cures Act makes medical notes like these available to patients in the interest of transparency. Please be advised this is a medical document. Medical documents are intended to carry relevant information, facts as evident, and the clinical opinion of the practitioner. The medical note is intended as peer to peer communication and may appear blunt or direct. It is written in medical language and may contain abbreviations or verbiage that are unfamiliar.      Date of Visit: 12/9/2024       Chief Complaint  Metastatic leiomyosarcoma - follow up.    Oncologic History  Stefany Bello is a 69 year old female who was found to have a large pelvic mass on imaging studies performed on 09/16/2014. On 09/18/2014 she underwent exploratory laparotomy, modified radical supracervical hysterectomy and bilateral salping-oophorectomy, and resection of what was described as a retroperitoneal mass. Pathology from the \"retroperitoneal\" mass showed a high grade leiomyosarcoma. Immunohistochemical studies for estrogen receptors were not performed. In the uterus, she was found to have a leiomyoma. From 12/02/2014 to 01/14/2015 she received adjuvant radiation therapy.     On 06/18/2015, she was found to have a scalp mass. Surgical resection showed leiomyosarcoma; immunohistochemical studies for estrogen receptors were not performed.     On 04/08/2019 she underwent biopsy of a left breast mass. Pathology showed invasive mammary carcinoma. Immunohistochemical studies were as follows: estrogen receptor <1%, progesterone receptor 0%, Her2 3+. Her2 amplified by FISH.    Beginning on 04/30/2019, she received neoadjuvant systemic therapy with doxorubicin and cyclophosphamide x 4 cycles. Beginning on 06/24/2019, she received  neoadjuvant systemic therapy with paclitaxel, trastuzumab, and pertuzumab. Paclitaxel was discontinued before completion for peripheral neuropathy. She then continued trastuzumab and pertuzumab.     On 10/09/2019 she underwent left breast lumpectomy with sentinel lymph node biopsy. Pathology showed no residual malignancy in the breast and 2 lymph nodes without metastasis (0/2).    Post-operatively she continued trastuzumab and pertuzumab. From 11/06/2019 to 12/18/2019 she received adjuvant radiation therapy.     On 11/25/2020, she underwent biopsy of a left scalp mass which showed leiomyosarcoma. Immunohistochemical studies for estrogen receptor were not performed.    On 02/09/2021 she underwent scalp debridement and reconstruction with a fasciocutaneous rotational flap.     CT chest, abdomen, pelvis on 05/18/2021 showed increasing size of pulmonary nodules.     On 12/16/2023 she underwent biopsy of a right superior anterior chest wall mass. Pathology showed leiomyosarcoma; immunohistochemical studies for estrogen receptors were negative.     On 02/14/2023 she began therapy with trabectedin. She received a total of 6 cycles of therapy.    On 06/28/2023 she underwent PET/CT scan after she noted an increase in size of the biopsy proven metastatic sarcomatous lesion in the right superior anterior chest wall. Imaging showed increase in size and metabolic activity of the right chest wall mass with involvement of the adjacent sternum and rib; a spiculated mixed solid and groundglass right upper lobe pulmonary nodule with SUV 3.3.    From 07/17/2023 to 08/04/2023 she received radiation therapy to the anterior right chest wall mass.     All of the above was performed at [facility].     On 08/07/2022, patient's tumor from the right chest wall was reviewed at [facility] - tumor was negative for estrogen receptors and had a Ki67 of 40-45%.    Tumor testing through [facility] showed the following: mutations in TP53,  RB1, and PTEN; TMB 1.6 m/MB; normal MMR; PDL1 TPS <1% and CPS <1.    From 11/01/2023 to 11/10/2023 she received radiation therapy to the right upper chest.     History of Present Illness  She denies any new self palpated masses. She denies any progressive respiratory symptoms. She has no pain.     Performance Status   Karnofsky 100% - Normal, no complaints.    Past Medical History (historical data, reviewed by physician)   Metastatic leiomyosarcoma (as above); invasive mammary carcinoma, left breast (as above); essential hypertension; COPD.    Past Surgical History (historical data, reviewed by physician)   TAHBSO and resection of \"retroperitoneal\" sarcoma (as above); left breast lumpectomy and sentinel lymph node biopsy; ectopic pregnancy; tonsillectomy and adenoidectomy.     Family History (historical data, reviewed by physician)   Paternal cousin with breast cancer age 40.     Social History (historical data, reviewed by physician)   History of tobacco use.     Current Medications   losartan-hydroCHLOROthiazide 50-12.5 MG Oral Tab Take 1 tablet by mouth daily.      DULoxetine 30 MG Oral Cap DR Particles Take 1 capsule (30 mg total) by mouth 3 (three) times daily.      cyanocobalamin 1000 MCG Oral Tab Take 1 tablet (1,000 mcg total) by mouth daily.      LORazepam 0.5 MG Oral Tab Take 1 tablet (0.5 mg total) by mouth nightly.      ibuprofen 100 MG Oral Tab Take 4 tablets (400 mg total) by mouth.      Cholecalciferol 50 MCG (2000 UT) Oral Tab Take 2 tablets (4,000 Units total) by mouth daily.       Allergies   Ms. Bello is allergic to iopamidol and radiology contrast iodinated dyes.    Vital Signs   /61 (BP Location: Left arm, Patient Position: Sitting, Cuff Size: adult)   Pulse 70   Temp 98.8 °F (37.1 °C) (Oral)   Resp 18   Ht 1.575 m (5' 2\")   Wt 63 kg (139 lb)   SpO2 96%   BMI 25.42 kg/m²     Physical Examination   Constitutional      Well developed, well nourished. Appears close to chronological  age. No apparent distress.   Head   Normocephalic and atraumatic.  Eyes   Conjunctiva clear; sclera anicteric.  ENMT                 External nose normal; external ears normal.  Neck   Supple; no masses.  Chest   Stable superior anterior chest wall mass.   Lymphatics  No cervical, supraclavicular, axillary adenopathy.  Respiratory          Normal effort; no respiratory distress; clear to auscultation bilaterally.  Cardiovascular  Regular rate and rhythm; normal S1S2.  Abdomen  Not distended.   Extremities  No lower extremity edema.   Neurologic           Motor and sensory grossly intact.  Psychiatric          Mood and affect appropriate.    Laboratory   No results found for this or any previous visit (from the past 48 hours).    Radiology  12/04/2024:  CT chest wo contrast - I independently visualized the radiologic images in addition to reviewing the written report: There are no new metastatic lesions. There is no increase in size of previously radiated metastases including the right chest wall mass. However, there is increase in SUV uptake in the right chest wall mass and a right lower lobe pulmonary nodule.     Impression and Plan   1.   Metastatic leiomyosarcoma: Patient has no disease related symptoms. PET scan shows no new metastatic lesions. The increase in SUV activity in the previously established lesions which have been radiated is uncertain significance. I recommend continued observation and active surveillance with CT imaging in 3 months. CT imaging will be without contrast due to allergic reaction despite premedication.     2.   Port-a-cath: Flushed today. It will be flushed again when she returns in 3 months.    Planned Follow Up   Patient will return for follow up in 3 months.    Electronically Signed by:     Justyn Zelaya M.D.  System Medical Director, Oncology Services  [place] [facility]

## 2024-12-04 ENCOUNTER — HOSPITAL ENCOUNTER (OUTPATIENT)
Dept: NUCLEAR MEDICINE | Facility: HOSPITAL | Age: 69
Discharge: HOME | End: 2024-12-04
Attending: SPECIALIST
Payer: MEDICARE

## 2024-12-04 DIAGNOSIS — C49.9 SECONDARY SARCOMA OF LUNG, UNSPECIFIED LATERALITY (HCC): ICD-10-CM

## 2024-12-04 DIAGNOSIS — C78.00 SECONDARY SARCOMA OF LUNG, UNSPECIFIED LATERALITY (HCC): ICD-10-CM

## 2024-12-04 DIAGNOSIS — C78.01 MALIGNANT NEOPLASM METASTATIC TO RIGHT LUNG (HCC): ICD-10-CM

## 2024-12-04 DIAGNOSIS — C49.9 LEIOMYOSARCOMA (HCC): ICD-10-CM

## 2024-12-04 DIAGNOSIS — C78.01 SECONDARY SARCOMA OF RIGHT LUNG (HCC): ICD-10-CM

## 2024-12-04 LAB — GLUCOSE BLDC GLUCOMTR-MCNC: 65 MG/DL (ref 70–99)

## 2024-12-04 PROCEDURE — 82962 GLUCOSE BLOOD TEST: CPT

## 2024-12-04 PROCEDURE — 78815 PET IMAGE W/CT SKULL-THIGH: CPT | Performed by: SPECIALIST

## 2024-12-09 ENCOUNTER — NURSE ONLY (OUTPATIENT)
Dept: HEMATOLOGY/ONCOLOGY | Facility: HOSPITAL | Age: 69
End: 2024-12-09
Attending: SPECIALIST
Payer: MEDICARE

## 2024-12-09 VITALS
RESPIRATION RATE: 18 BRPM | WEIGHT: 139 LBS | DIASTOLIC BLOOD PRESSURE: 61 MMHG | BODY MASS INDEX: 25.58 KG/M2 | OXYGEN SATURATION: 96 % | SYSTOLIC BLOOD PRESSURE: 122 MMHG | HEIGHT: 62 IN | TEMPERATURE: 99 F | HEART RATE: 70 BPM

## 2024-12-09 DIAGNOSIS — C78.01 SECONDARY SARCOMA OF RIGHT LUNG (HCC): Primary | ICD-10-CM

## 2024-12-09 DIAGNOSIS — R94.2 ABNORMAL PET OF RIGHT LUNG: ICD-10-CM

## 2024-12-09 DIAGNOSIS — C78.01 MALIGNANT NEOPLASM METASTATIC TO RIGHT LUNG (HCC): ICD-10-CM

## 2024-12-09 DIAGNOSIS — C49.9 LEIOMYOSARCOMA (HCC): ICD-10-CM

## 2024-12-09 DIAGNOSIS — Z45.2 ENCOUNTER FOR CARE RELATED TO PORT-A-CATH: ICD-10-CM

## 2024-12-09 PROCEDURE — 99214 OFFICE O/P EST MOD 30 MIN: CPT | Performed by: SPECIALIST

## 2024-12-09 PROCEDURE — G2211 COMPLEX E/M VISIT ADD ON: HCPCS | Performed by: SPECIALIST

## 2024-12-09 PROCEDURE — 96523 IRRIG DRUG DELIVERY DEVICE: CPT

## 2025-03-05 ENCOUNTER — TELEPHONE (OUTPATIENT)
Age: 70
End: 2025-03-05

## 2025-03-05 NOTE — TELEPHONE ENCOUNTER
ADRIANA to schedule a Port Flush and a Follow Up with Dr. Zelaya a few days after her CT SCAN per Adelita. 3/5/25

## 2025-03-10 ENCOUNTER — HOSPITAL ENCOUNTER (OUTPATIENT)
Dept: CT IMAGING | Facility: HOSPITAL | Age: 70
Discharge: HOME | End: 2025-03-10
Attending: SPECIALIST
Payer: MEDICARE

## 2025-03-10 DIAGNOSIS — C78.01 SECONDARY SARCOMA OF RIGHT LUNG (HCC): ICD-10-CM

## 2025-03-10 DIAGNOSIS — C49.9 LEIOMYOSARCOMA (HCC): ICD-10-CM

## 2025-03-10 PROCEDURE — 71250 CT THORAX DX C-: CPT | Performed by: SPECIALIST

## 2025-03-10 PROCEDURE — 74176 CT ABD & PELVIS W/O CONTRAST: CPT | Performed by: SPECIALIST

## 2025-03-18 ENCOUNTER — TELEPHONE (OUTPATIENT)
Age: 70
End: 2025-03-18

## 2025-03-18 NOTE — TELEPHONE ENCOUNTER
Patient is calling back because she received a call this morning and she is returning the call.   Dr. Zelaya pt

## 2025-03-27 ENCOUNTER — TELEPHONE (OUTPATIENT)
Age: 70
End: 2025-03-27

## 2025-03-27 NOTE — PROGRESS NOTES
Nursing Consultation Note  Patient: Stefany Bello  YOB: 1955  Age: 69 year old  Radiation Oncologist: Dr. Sabra Mohr  Referring Physician: Justyn Zelaya  Diagnosis:[unfilled]  Consult Date: 3/27/2025      Chemotherapy: N/A  Labs: Reviewed  Imaging: Reviewed  Is the patient of child-bearing age?         No  Has the patient received radiation therapy in the past? yes  2015 - Leiomyosarcoma  2019 - L breast   2023 - R CW   2023 - Lung RUL   Does the patient have an implantable device?No   Patient has/has had:     1. Assistive Devices: N/A    2. Flu Vaccination: yes    3. Pneumonia Vaccination:  yes    Vital Signs:   Vitals:    03/28/25 1237   BP: 125/65   Pulse: 70   Resp: 18   Temp: 98.1 °F (36.7 °C)   ,   Wt Readings from Last 6 Encounters:   03/28/25 63.8 kg (140 lb 9.6 oz)   12/09/24 63 kg (139 lb)   09/09/24 62.5 kg (137 lb 12.8 oz)   06/10/24 61.7 kg (136 lb)   04/08/24 62.1 kg (137 lb)   02/16/24 60.2 kg (132 lb 12.8 oz)       Nursing Note:    Hx of leiomyosarcoma. Has hx of previous RTs done at [facility]. Previously completed SBRT to lung RUL on 11/10/23 with Dr. Leobardo Solares. Had CT chest 3/10/25. Here for consult for enlarging RLL nodule.  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 Denies any SOB or dysphagia. Has chronic productive cough. Still smoking 2-3 cigarettes/day. Appetite normal, eating and drinking fine. Denies any nausea or vomiting. F/u with Dr. Zelaya on 4/7/25.         Review of Systems   Constitutional: Negative.    HENT: Negative.     Eyes: Negative.    Respiratory:  Positive for cough.         Chronic cough   Cardiovascular: Negative.    Gastrointestinal:  Positive for abdominal pain.        Dull pain- comes and goes  Sharp pain at times   Genitourinary:  Positive for urgency.   Musculoskeletal:  Positive for arthralgias, neck pain and neck stiffness.        Taking OTC PRN   Skin: Negative.    Allergic/Immunologic: Positive for  environmental allergies.   Neurological:  Positive for headaches.        Occ HA from neck pain   Hematological: Negative.    Psychiatric/Behavioral: Negative.            Allergies:  Allergies[1]    Current Outpatient Medications   Medication Sig Dispense Refill    buPROPion  MG Oral Tablet 12 Hr Take 1 tablet (150 mg total) by mouth daily.      fluticasone propionate 50 MCG/ACT Nasal Suspension 2 sprays by Nasal route daily.      losartan-hydroCHLOROthiazide 50-12.5 MG Oral Tab Take 1 tablet by mouth daily.      cyanocobalamin 1000 MCG Oral Tab Take 1 tablet (1,000 mcg total) by mouth daily.      LORazepam 0.5 MG Oral Tab Take 1 tablet (0.5 mg total) by mouth nightly.      ibuprofen 100 MG Oral Tab Take 4 tablets (400 mg total) by mouth.      Cholecalciferol 50 MCG (2000 UT) Oral Tab Take 2 tablets (4,000 Units total) by mouth daily.      DULoxetine 30 MG Oral Cap DR Particles Take 1 capsule (30 mg total) by mouth 3 (three) times daily. (Patient not taking: Reported on 3/28/2025)         Preferred Pharmacy:    [id number] [place] [address] [address] [facility], [phone], [phone]  [address] [place] [id number]  Phone: [phone] Fax: [phone]      Past Medical History:    Cancer (HCC)    breast, carcoma    Hypertension    Neuropathy       Past Surgical History:   Procedure Laterality Date    Lumpectomy left      Total abdom hysterectomy      Total knee replacement Right        Social History     Socioeconomic History    Marital status:      Spouse name: Not on file    Number of children: Not on file    Years of education: Not on file    Highest education level: Not on file   Occupational History    Not on file   Tobacco Use    Smoking status: Every Day     Types: Cigarettes    Smokeless tobacco: Never    Tobacco comments:     2-3 cigarettes/day   Vaping Use    Vaping status: Never Used   Substance and Sexual Activity    Alcohol use: Yes      Alcohol/week: 4.0 standard drinks of alcohol     Types: 2 Glasses of wine, 2 Cans of beer per week    Drug use: Not Currently     Types: Cannabis     Comment: occasional marijuana use to assist with sleeping    Sexual activity: Not on file   Other Topics Concern    Not on file   Social History Narrative    Living alone with son downstairs    Daughter lives close by     Social Drivers of Health     Food Insecurity: No Food Insecurity (5/13/2024)    Received from [facility]    Hunger Vital Sign     Worried About Running Out of Food in the Last Year: Never true     Ran Out of Food in the Last Year: Never true   Transportation Needs: No Transportation Needs (3/4/2024)    [facility]    PRAPARE - Transportation     Lack of Transportation (Medical): No     Lack of Transportation (Non-Medical): No   Housing Stability: Low Risk  (3/4/2024)    [facility]    Housing Stability Vital Sign     Unable to Pay for Housing in the Last Year: No     Number of Places Lived in the Last Year: 1     Unstable Housing in the Last Year: No       ECOG:  Grade 0 - Fully active, able to carry on all predisease activities without restrictions.      Education:  Yes    Knowledge Deficit Plan Of Care:    Problem:  Knowledge Deficit    Problems related to:    Radiation therapy    Interventions:  Instruct on purpose of radiation therapy  Instruct on side effects of radiation therapy    Expected Outcomes:  Knowledge of radiation therapy  Knowledge of side effects of radiation therapy and management    Progress Toward Outcome:  Making progress    Pamphlets/Handouts Given to Patient:  Understanding radiation therapy      Are ADL's met?  Yes  Does patient feel safe in their environment?  Yes  Care decisions:  Patient and/or surrogate IS involved in care decisions.  Advanced directives:  Patient  DOES NOT have advanced directives.  Transportation:  Adequate transportation available for expected visits    Pain:   ;Pain Score: 0   ;    ;          [1]   Allergies  Allergen Reactions    Iopamidol ANAPHYLAXIS    Radiology Contrast Iodinated Dyes ANAPHYLAXIS

## 2025-03-28 ENCOUNTER — OFFICE VISIT (OUTPATIENT)
Dept: RADIATION ONCOLOGY | Facility: HOSPITAL | Age: 70
End: 2025-03-28
Attending: RADIOLOGY
Payer: MEDICARE

## 2025-03-28 VITALS
DIASTOLIC BLOOD PRESSURE: 65 MMHG | SYSTOLIC BLOOD PRESSURE: 125 MMHG | WEIGHT: 140.63 LBS | HEART RATE: 70 BPM | OXYGEN SATURATION: 95 % | RESPIRATION RATE: 18 BRPM | TEMPERATURE: 98 F | BODY MASS INDEX: 26 KG/M2

## 2025-03-28 DIAGNOSIS — C78.01 SECONDARY MALIGNANCY OF RIGHT LOWER LOBE OF LUNG (HCC): Primary | ICD-10-CM

## 2025-03-28 PROCEDURE — 99212 OFFICE O/P EST SF 10 MIN: CPT

## 2025-03-28 RX ORDER — BUPROPION HYDROCHLORIDE 150 MG/1
150 TABLET, EXTENDED RELEASE ORAL DAILY
COMMUNITY
Start: 2025-03-03

## 2025-03-28 RX ORDER — FLUTICASONE PROPIONATE 50 MCG
2 SPRAY, SUSPENSION (ML) NASAL DAILY
COMMUNITY

## 2025-03-28 NOTE — PATIENT INSTRUCTIONS
- WE WILL CALL TO SCHEDULE YOUR CT SIMULATION FOR RADIATION PLANNING.    - IF YOU HAVE ANY QUESTIONS OR CONCERNS REGARDING RADIATION THERAPY, PLEASE CALL [phone]

## 2025-03-28 NOTE — CONSULTS
[facility]    PATIENT'S NAME: RODNEY VALLE   RADIATION ONCOLOGIST: Joe Solares MD   PATIENT ACCOUNT #: [id number] LOCATION: [facility]   MEDICAL RECORD #: [id number] YOB: 1955   CONSULTATION DATE: 03/28/2025       RADIATION ONCOLOGY CONSULTATION    REFERRING PHYSICIAN:  Justyn Zelaya MD     DIAGNOSIS:  Metastatic leiomyosarcoma.    HISTORY OF PRESENT ILLNESS:  The patient is a 69-year-old female who is well known to me.  She has a long history of leiomyosarcoma initially diagnosed back in 2014.  At the time of diagnosis, she had surgery and resection of a peritoneal mass, which showed a high-grade leiomyosarcoma, and she was treated with adjuvant radiation at an outside institution.  She later had a scalp mass in 2015, and resection also showed this to be leiomyosarcoma.  By 2019, a biopsy of a left breast mass showed invasive mammary carcinoma noted to be ER/NY negative and HER2 positive.  She had neoadjuvant chemotherapy followed by a left breast lumpectomy and sentinel lymph node biopsy with a complete response.  She did receive adjuvant radiation alongside adjuvant trastuzumab and pertuzumab.  An additional scalp mass popped up in 2020 and was again leiomyosarcoma.  By December 2022, a right superior anterior chest wall mass was found, and biopsy again showed recurrent leiomyosarcoma.  She was switched to trabectedin and then received radiation to the anterior chest wall mass, completing in August 2023 at [facility].  Later, she developed lung metastasis with 2 lesions in the right upper lobe, and this area was treated to 5000 cGy in 5 fractions completing in November 2023.     The patient continues to be under Dr. Zelaya's care, and I have not seen her since her most recent course of ablative radiotherapy.  Recently, she had imaging including a CT scan of the chest, abdomen, and pelvis on 03/10/2025, which showed an enlarging right lower lobe pulmonary nodule  which now measures 0.9 x 0.8 cm.  This had been 0.4 x 0.4 cm on the prior scan from 6 months ago.  Based upon the clinical and radiographic characteristics, this appeared highly likely to represent a new metastatic deposit of leiomyosarcoma, and the patient is then referred back to Radiation Oncology to consider treatment to this oligoprogressive lesion.    The patient otherwise remains asymptomatic and has no symptoms from her current disease.  She specifically denies any cough, shortness of breath, hemoptysis, weight loss, changes in appetite, or other significant findings.    PHYSICAL EXAMINATION:    VITAL SIGNS:  On exam today, the patient is noted to have a blood pressure of 125/65, pulse of 70, respiratory rate of 18, and a temperature of 98.1.  She has a pain score of 0 and a weight of 140 pounds.  HEENT:  Pupils are equal, round, and reactive to light with accommodation, and the extraocular movements are intact.  The oral cavity is without ulcerations or lesions.  NECK:  Supple with no lymphadenopathy.  LUNGS:  Chest is clear to auscultation bilaterally.  HEART:  Regular rate and rhythm, with normal S1 and S2, and no audible murmurs.  LYMPHATICS:  There is no supraclavicular, axillary, or inguinal lymphadenopathy.  ABDOMEN:  Soft, nontender, nondistended, with normoactive bowel sounds and no hepatosplenomegaly.  EXTREMITIES:  Without clubbing, cyanosis, or edema.  NEUROLOGIC:  Cranial nerves II through XII are grossly intact, and there are no focal deficits.    IMPRESSION:  This is a 69-year-old female with a long history of metastatic leiomyosarcoma.  She has had a number of metastatic deposits with treatment in the past.  Despite her continued evidence of metastases at times, she has always done very well and continues under surveillance under Dr. Zelaya's care with an excellent and high quality of life.  Recently, imaging shows an enlarging lesion in the right lower lobe which appears to represent a new  oligoprogressive focus.  There is no other area of active disease at this time.    RECOMMENDATIONS:  I do believe the patient is an excellent candidate for ablative therapy to her new right lower lobe malignancy.  She continues to have an excellent performance status with no other area of active disease.  She is not undergoing any treatment, and radiation can eradicate her current site of oligoprogression.  This would allow her to continue to be observed only with no need for intervention with additional systemic agents at this time.  The patient appears to have a long general disease history and continues to have a good quality of life at this point.    I, therefore, would recommend 5000 cGy in 5 fractions to an area encompassing the current lesion in the right lower lobe.  This is a similar dose and fractionation to what I delivered to the right upper lobe, and the patient tolerated this well with an excellent response.  I am optimistic that she should tolerate this well, and I do not expect much in the way of significant morbidity.    I talked to the patient again about the possible side effects of this treatment.  As she has had this type of treatment in the past, she is well aware of the potential side effects.  I did tell her that there is a possibility of some fatigue and a remote risk of radiation pneumonitis or other lung-related damage.  Her pulmonary functioning will decline a slight amount, but this is unlikely to have any clinical ramifications.  Following this long and thorough discussion of all the risks and benefits of treatment, the patient indicated that she understood all these issues and does wish to proceed with treatment as I previously dictated.    We, therefore, will schedule the patient for simulation soon with the intent to begin her treatment shortly thereafter.      Thank you very much for allowing me the opportunity to participate in the care of this patient.  If there should be any  questions regarding the radiotherapy, please feel free to contact me at any time.     Dictated By Joe Solares MD  d: 03/28/2025 13:41:27  t: 03/28/2025 14:32:21  [facility] [id number]  NAD/    cc: MD Dr. Terry Schroeder

## 2025-03-31 ENCOUNTER — TELEPHONE (OUTPATIENT)
Age: 70
End: 2025-03-31

## 2025-03-31 NOTE — TELEPHONE ENCOUNTER
Pt called and asked to speak to Shanna in Dr. Leobardo Solares's office regarding her treatment plan    Please give the pt a call back

## 2025-04-02 RX ORDER — SODIUM CHLORIDE 9 MG/ML
10 INJECTION, SOLUTION INTRAMUSCULAR; INTRAVENOUS; SUBCUTANEOUS ONCE
OUTPATIENT
Start: 2025-04-02

## 2025-04-04 ENCOUNTER — OFFICE VISIT (OUTPATIENT)
Dept: RADIATION ONCOLOGY | Facility: HOSPITAL | Age: 70
End: 2025-04-04
Attending: RADIOLOGY
Payer: MEDICARE

## 2025-04-04 PROCEDURE — 77334 RADIATION TREATMENT AID(S): CPT | Performed by: RADIOLOGY

## 2025-04-06 NOTE — PROGRESS NOTES
[facility] Hematology Oncology Group Progress Note       Patient Name: Dionne Bello   YOB: 1955  Medical Record Number: [id number]  Consulting Physician: Justyn Zelaya M.D.     The 21st Century Cures Act makes medical notes like these available to patients in the interest of transparency. Please be advised this is a medical document. Medical documents are intended to carry relevant information, facts as evident, and the clinical opinion of the practitioner. The medical note is intended as peer to peer communication and may appear blunt or direct. It is written in medical language and may contain abbreviations or verbiage that are unfamiliar.      Date of Visit: 4/7/2025       Chief Complaint  Metastatic leiomyosarcoma - follow up.    Oncologic History  Stefany Bello is a 69 year old female who was found to have a large pelvic mass on imaging studies performed on 09/16/2014. On 09/18/2014 she underwent exploratory laparotomy, modified radical supracervical hysterectomy and bilateral salping-oophorectomy, and resection of what was described as a retroperitoneal mass. Pathology from the \"retroperitoneal\" mass showed a high grade leiomyosarcoma. Immunohistochemical studies for estrogen receptors were not performed. In the uterus, she was found to have a leiomyoma. From 12/02/2014 to 01/14/2015 she received adjuvant radiation therapy.     On 06/18/2015, she was found to have a scalp mass. Surgical resection showed leiomyosarcoma; immunohistochemical studies for estrogen receptors were not performed.     On 04/08/2019 she underwent biopsy of a left breast mass. Pathology showed invasive mammary carcinoma. Immunohistochemical studies were as follows: estrogen receptor <1%, progesterone receptor 0%, Her2 3+. Her2 amplified by FISH.    Beginning on 04/30/2019, she received neoadjuvant systemic therapy with doxorubicin and cyclophosphamide x 4 cycles. Beginning on 06/24/2019, she received  neoadjuvant systemic therapy with paclitaxel, trastuzumab, and pertuzumab. Paclitaxel was discontinued before completion for peripheral neuropathy. She then continued trastuzumab and pertuzumab.     On 10/09/2019 she underwent left breast lumpectomy with sentinel lymph node biopsy. Pathology showed no residual malignancy in the breast and 2 lymph nodes without metastasis (0/2).    Post-operatively she continued trastuzumab and pertuzumab. From 11/06/2019 to 12/18/2019 she received adjuvant radiation therapy.     On 11/25/2020, she underwent biopsy of a left scalp mass which showed leiomyosarcoma. Immunohistochemical studies for estrogen receptor were not performed.    On 02/09/2021 she underwent scalp debridement and reconstruction with a fasciocutaneous rotational flap.     CT chest, abdomen, pelvis on 05/18/2021 showed increasing size of pulmonary nodules.     On 12/16/2023 she underwent biopsy of a right superior anterior chest wall mass. Pathology showed leiomyosarcoma; immunohistochemical studies for estrogen receptors were negative.     On 02/14/2023 she began therapy with trabectedin. She received a total of 6 cycles of therapy.    On 06/28/2023 she underwent PET/CT scan after she noted an increase in size of the biopsy proven metastatic sarcomatous lesion in the right superior anterior chest wall. Imaging showed increase in size and metabolic activity of the right chest wall mass with involvement of the adjacent sternum and rib; a spiculated mixed solid and groundglass right upper lobe pulmonary nodule with SUV 3.3.    From 07/17/2023 to 08/04/2023 she received radiation therapy to the anterior right chest wall mass.     All of the above was performed at [facility].     On 08/07/2022, patient's tumor from the right chest wall was reviewed at [facility] - tumor was negative for estrogen receptors and had a Ki67 of 40-45%.    [facility] showed the following: mutations in TP53,  RB1, and PTEN; TMB 1.6 m/MB; normal MMR; PDL1 TPS <1% and CPS <1.    From 11/01/2023 to 11/10/2023 she received radiation therapy to the right upper chest.     Routine imaging on 03/10/2025 showed increase in size of an established right lower lobe pulmonary nodule.     History of Present Illness  She denies any new self palpated masses. She denies any progressive respiratory symptoms. She has no pain. She feels a \"lump\" in the left breast.     Performance Status   Karnofsky 100% - Normal, no complaints.    Past Medical History (historical data, reviewed by physician)   Metastatic leiomyosarcoma (as above); invasive mammary carcinoma, left breast (as above); essential hypertension; COPD.    Past Surgical History (historical data, reviewed by physician)   TAHBSO and resection of \"retroperitoneal\" sarcoma (as above); left breast lumpectomy and sentinel lymph node biopsy; ectopic pregnancy; tonsillectomy and adenoidectomy.     Family History (historical data, reviewed by physician)   Paternal cousin with breast cancer age 40.     Social History (historical data, reviewed by physician)   History of tobacco use.     Current Medications   buPROPion  MG Oral Tablet 12 Hr Take 1 tablet (150 mg total) by mouth daily.      fluticasone propionate 50 MCG/ACT Nasal Suspension 2 sprays by Nasal route daily.      losartan-hydroCHLOROthiazide 50-12.5 MG Oral Tab Take 1 tablet by mouth daily.      DULoxetine 30 MG Oral Cap DR Particles Take 1 capsule (30 mg total) by mouth 3 (three) times daily.      cyanocobalamin 1000 MCG Oral Tab Take 1 tablet (1,000 mcg total) by mouth daily.      LORazepam 0.5 MG Oral Tab Take 1 tablet (0.5 mg total) by mouth nightly.      ibuprofen 100 MG Oral Tab Take 4 tablets (400 mg total) by mouth.      Cholecalciferol 50 MCG (2000 UT) Oral Tab Take 2 tablets (4,000 Units total) by mouth daily.       Allergies   Ms. Bello is allergic to iopamidol and radiology contrast iodinated dyes.    Vital  Signs   /89 (BP Location: Left arm, Patient Position: Sitting, Cuff Size: adult)   Pulse 63   Temp 98 °F (36.7 °C) (Oral)   Resp 18   Ht 1.575 m (5' 2\")   Wt 63.4 kg (139 lb 11.2 oz)   SpO2 100%   BMI 25.55 kg/m²     Physical Examination   Constitutional      Well developed, well nourished. No apparent distress.   Head   Normocephalic and atraumatic.  Eyes   Conjunctiva clear; sclera anicteric.  ENMT                 External nose normal; external ears normal.  Neck   Supple; no masses.  Chest   Stable superior anterior chest wall mass.   Breasts   Left breast with palpable nodularity in the area of previous surgery.   Lymphatics  No cervical, supraclavicular, axillary adenopathy.  Respiratory          Normal effort; no respiratory distress; clear to auscultation bilaterally.  Cardiovascular  Regular rate and rhythm; normal S1S2.  Abdomen  Not distended.   Extremities  No lower extremity edema.   Neurologic           Motor and sensory grossly intact.  Psychiatric          Mood and affect appropriate.    Laboratory   No results found for this or any previous visit (from the past 48 hours).    Radiology  03/10/2025:  CT chest, abdomen, pelvis wo contrast - I independently visualized the radiologic images in addition to reviewing the written report: Increase in size of previously established right lower lobe mass. No other evidence of progressive disease. In the left breast, there is an area of nodularity that corresponds to her physical exam.     Impression and Plan   1.   Metastatic leiomyosarcoma: Patient has no disease related symptoms. Imaging studies show increase in size of a right lower lobe metastatic lesion. I discussed the case with Dr. Leobardo Solares who agrees that definitive radiation therapy to that lesion is possible. She will pursue such therapy.     2.   Port-a-cath: Flushed today.     3.   Left breast nodularity: Bilateral diagnostic mammography is ordered.     Planned Follow Up   Follow up  will be arranged after the completion of radiation therapy.     Electronically Signed by:     Justyn Zelaya M.D.  System Medical Director, Oncology Services  [facility]

## 2025-04-07 ENCOUNTER — OFFICE VISIT (OUTPATIENT)
Age: 70
End: 2025-04-07
Attending: SPECIALIST
Payer: MEDICARE

## 2025-04-07 ENCOUNTER — NURSE ONLY (OUTPATIENT)
Age: 70
End: 2025-04-07
Attending: SPECIALIST
Payer: MEDICARE

## 2025-04-07 VITALS
DIASTOLIC BLOOD PRESSURE: 89 MMHG | HEIGHT: 62 IN | WEIGHT: 139.69 LBS | TEMPERATURE: 98 F | HEART RATE: 63 BPM | RESPIRATION RATE: 18 BRPM | BODY MASS INDEX: 25.7 KG/M2 | SYSTOLIC BLOOD PRESSURE: 125 MMHG | OXYGEN SATURATION: 100 %

## 2025-04-07 DIAGNOSIS — C49.9 LEIOMYOSARCOMA (HCC): ICD-10-CM

## 2025-04-07 DIAGNOSIS — N63.22 MASS OF UPPER INNER QUADRANT OF LEFT BREAST: Primary | ICD-10-CM

## 2025-04-07 DIAGNOSIS — C78.01 SECONDARY SARCOMA OF RIGHT LUNG (HCC): ICD-10-CM

## 2025-04-08 ENCOUNTER — APPOINTMENT (OUTPATIENT)
Dept: RADIATION ONCOLOGY | Facility: HOSPITAL | Age: 70
End: 2025-04-08
Attending: RADIOLOGY
Payer: MEDICARE

## 2025-04-08 PROCEDURE — 77399 UNLISTED PX MED RADJ PHYSICS: CPT | Performed by: RADIOLOGY

## 2025-04-08 PROCEDURE — 77470 SPECIAL RADIATION TREATMENT: CPT | Performed by: RADIOLOGY

## 2025-04-14 PROCEDURE — 77300 RADIATION THERAPY DOSE PLAN: CPT | Performed by: RADIOLOGY

## 2025-04-14 PROCEDURE — 77338 DESIGN MLC DEVICE FOR IMRT: CPT | Performed by: RADIOLOGY

## 2025-04-14 PROCEDURE — 77301 RADIOTHERAPY DOSE PLAN IMRT: CPT | Performed by: RADIOLOGY

## 2025-04-14 PROCEDURE — 77293 RESPIRATOR MOTION MGMT SIMUL: CPT | Performed by: RADIOLOGY

## 2025-04-18 ENCOUNTER — APPOINTMENT (OUTPATIENT)
Dept: RADIATION ONCOLOGY | Facility: HOSPITAL | Age: 70
End: 2025-04-18
Attending: RADIOLOGY
Payer: MEDICARE

## 2025-04-18 PROCEDURE — 77373 STRTCTC BDY RAD THER TX DLVR: CPT | Performed by: RADIOLOGY

## 2025-04-21 ENCOUNTER — APPOINTMENT (OUTPATIENT)
Dept: RADIATION ONCOLOGY | Facility: HOSPITAL | Age: 70
End: 2025-04-21
Attending: RADIOLOGY
Payer: MEDICARE

## 2025-04-21 PROCEDURE — 77373 STRTCTC BDY RAD THER TX DLVR: CPT | Performed by: RADIOLOGY

## 2025-04-23 ENCOUNTER — APPOINTMENT (OUTPATIENT)
Dept: RADIATION ONCOLOGY | Facility: HOSPITAL | Age: 70
End: 2025-04-23
Attending: RADIOLOGY
Payer: MEDICARE

## 2025-04-23 PROCEDURE — 77373 STRTCTC BDY RAD THER TX DLVR: CPT | Performed by: RADIOLOGY

## 2025-04-24 NOTE — PROGRESS NOTES
[facility] Radiation Treatment Management Note 1-5    Patient:  Stefany Bello  Age:  69 year old  Visit Diagnosis:    1. 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 Joe Solares    Site Delivered Dose (cGy) Prescribed Dose (cGy) Fraction #   RLL SBRT 4000 5000 4/5           First treatment date:  4/18/25  Concurrent chemotherapy: N/A        3/28/2025    12:37 PM 4/7/2025     3:01 PM 4/25/2025     2:52 PM   Oncology Vitals   Height  5' 2\"    Height  158 cm    Weight 140 lb 9.6 oz 139 lb 11.2 oz    Weight 63.776 kg 63.368 kg    BSA (m2) 1.65 m2 1.64 m2    BMI 25.72 kg/m2 25.55 kg/m2    /65 125/89 127/53   Pulse 70 63 70   Resp 18 18 16   Temp 98.1 °F (36.7 °C) 98 °F (36.7 °C) 97.3 °F (36.3 °C)   SpO2 95 % 100 % 100 %   Pain Score 0 0 0        Toxicities:  Fatigue Grade 1= Fatigue relieved by rest- at baseline   Dysphagia Grade 0= None  Dyspepsia Grade 0= None  Nausea Grade 0= None  Vomiting Grade 0= None  Cough Grade 1= Mild symptoms; nonprescription intervention indicated- baseline. Occasional clear phlegm   Dyspnea Grade 1= Shortness of breath with moderate exertion- going up stairs    Nursing Note:  Patient seen for OTV with Dr. Leobardo Solares.   Staying active, still working at an elementary school   Chronic cough  Shortness of breath with exertion.   AVS given to patient.     Nikki LILLY RN    Physician Note:  Subjective:  Doing well, no issues or c/o.  No side effects from therapy.      Objective:  Unchanged      Treatment setup imaging have been reviewed:  Yes    Assessment/Plan:    Continue radiotherapy per plan    Completes Monday    Next visit:  f/u 3 months with CT    Dr. Joe Solares

## 2025-04-25 ENCOUNTER — OFFICE VISIT (OUTPATIENT)
Dept: RADIATION ONCOLOGY | Facility: HOSPITAL | Age: 70
End: 2025-04-25
Attending: RADIOLOGY
Payer: MEDICARE

## 2025-04-25 VITALS
RESPIRATION RATE: 16 BRPM | DIASTOLIC BLOOD PRESSURE: 53 MMHG | HEART RATE: 70 BPM | SYSTOLIC BLOOD PRESSURE: 127 MMHG | OXYGEN SATURATION: 100 % | TEMPERATURE: 97 F

## 2025-04-25 DIAGNOSIS — C78.01 SECONDARY MALIGNANCY OF RIGHT LOWER LOBE OF LUNG (HCC): Primary | ICD-10-CM

## 2025-04-25 PROCEDURE — 77373 STRTCTC BDY RAD THER TX DLVR: CPT | Performed by: RADIOLOGY

## 2025-04-25 RX ORDER — MECLIZINE HYDROCHLORIDE 25 MG/1
25 TABLET ORAL
COMMUNITY
Start: 2022-10-12

## 2025-04-25 RX ORDER — DICYCLOMINE HYDROCHLORIDE 10 MG/1
10 CAPSULE ORAL
COMMUNITY
Start: 2024-03-25

## 2025-04-25 NOTE — PATIENT INSTRUCTIONS
Follow up with Dr. Leobardo Solares in 3 months with a CT.  Nargis will call you to schedule your follow up appointment.     Schedule your CT scan prior to your follow up. Call [phone] to schedule.    Schedule an appointment with Dr. Zelaya    Please call [phone] with any radiation questions.

## 2025-04-28 ENCOUNTER — APPOINTMENT (OUTPATIENT)
Dept: RADIATION ONCOLOGY | Facility: HOSPITAL | Age: 70
End: 2025-04-28
Attending: RADIOLOGY
Payer: MEDICARE

## 2025-04-28 PROCEDURE — 77373 STRTCTC BDY RAD THER TX DLVR: CPT | Performed by: RADIOLOGY

## 2025-04-29 PROCEDURE — 77336 RADIATION PHYSICS CONSULT: CPT | Performed by: RADIOLOGY

## 2025-04-30 ENCOUNTER — TELEPHONE (OUTPATIENT)
Age: 70
End: 2025-04-30

## 2025-04-30 DIAGNOSIS — N63.20 MASS OF LEFT BREAST, UNSPECIFIED QUADRANT: Primary | ICD-10-CM

## 2025-04-30 DIAGNOSIS — C49.9 LEIOMYOSARCOMA (HCC): ICD-10-CM

## 2025-04-30 DIAGNOSIS — Z12.31 ENCOUNTER FOR SCREENING MAMMOGRAM FOR MALIGNANT NEOPLASM OF BREAST: ICD-10-CM

## 2025-04-30 NOTE — TELEPHONE ENCOUNTER
Called Dionne to check if she needs assistance obtaining her images from [place]- was unable to reach patient LM on VM with number for callback of she needs assistance obtaining imaging from [place].

## 2025-04-30 NOTE — TELEPHONE ENCOUNTER
Melani with [employer] calling in regards to Mammogram order. Pt informed her last mammogram was completed a year ago. The current order is for additional views and Melani wants to confirm should it be for a regular order.       Please contact Melani to confirm. Ext. [phone] she there today until 3:30.

## 2025-05-19 ENCOUNTER — OFFICE VISIT (OUTPATIENT)
Age: 70
End: 2025-05-19
Attending: SPECIALIST
Payer: MEDICARE

## 2025-05-19 VITALS
RESPIRATION RATE: 18 BRPM | HEART RATE: 66 BPM | TEMPERATURE: 98 F | SYSTOLIC BLOOD PRESSURE: 119 MMHG | WEIGHT: 139.69 LBS | OXYGEN SATURATION: 98 % | BODY MASS INDEX: 25.7 KG/M2 | DIASTOLIC BLOOD PRESSURE: 61 MMHG | HEIGHT: 62 IN

## 2025-05-19 DIAGNOSIS — C49.9 LEIOMYOSARCOMA (HCC): ICD-10-CM

## 2025-05-19 DIAGNOSIS — C78.01 SECONDARY SARCOMA OF RIGHT LUNG (HCC): ICD-10-CM

## 2025-05-19 NOTE — PROGRESS NOTES
[facility] Hematology Oncology Group Progress Note       Patient Name: Dionne Bello   YOB: 1955  Medical Record Number: [id number]  Consulting Physician: Justyn Zelaya M.D.     The 21st Century Cures Act makes medical notes like these available to patients in the interest of transparency. Please be advised this is a medical document. Medical documents are intended to carry relevant information, facts as evident, and the clinical opinion of the practitioner. The medical note is intended as peer to peer communication and may appear blunt or direct. It is written in medical language and may contain abbreviations or verbiage that are unfamiliar.      Date of Visit: 5/19/2025       Chief Complaint  Metastatic leiomyosarcoma - follow up.    Oncologic History  Stefany Bello is a 69 year old female who was found to have a large pelvic mass on imaging studies performed on 09/16/2014. On 09/18/2014 she underwent exploratory laparotomy, modified radical supracervical hysterectomy and bilateral salping-oophorectomy, and resection of what was described as a retroperitoneal mass. Pathology from the \"retroperitoneal\" mass showed a high grade leiomyosarcoma. Immunohistochemical studies for estrogen receptors were not performed. In the uterus, she was found to have a leiomyoma. From 12/02/2014 to 01/14/2015 she received adjuvant radiation therapy.     On 06/18/2015, she was found to have a scalp mass. Surgical resection showed leiomyosarcoma; immunohistochemical studies for estrogen receptors were not performed.     On 04/08/2019 she underwent biopsy of a left breast mass. Pathology showed invasive mammary carcinoma. Immunohistochemical studies were as follows: estrogen receptor <1%, progesterone receptor 0%, Her2 3+. Her2 amplified by FISH.    Beginning on 04/30/2019, she received neoadjuvant systemic therapy with doxorubicin and cyclophosphamide x 4 cycles. Beginning on 06/24/2019, she received  neoadjuvant systemic therapy with paclitaxel, trastuzumab, and pertuzumab. Paclitaxel was discontinued before completion for peripheral neuropathy. She then continued trastuzumab and pertuzumab.     On 10/09/2019 she underwent left breast lumpectomy with sentinel lymph node biopsy. Pathology showed no residual malignancy in the breast and 2 lymph nodes without metastasis (0/2).    Post-operatively she continued trastuzumab and pertuzumab. From 11/06/2019 to 12/18/2019 she received adjuvant radiation therapy.     On 11/25/2020, she underwent biopsy of a left scalp mass which showed leiomyosarcoma. Immunohistochemical studies for estrogen receptor were not performed.    On 02/09/2021 she underwent scalp debridement and reconstruction with a fasciocutaneous rotational flap.     CT chest, abdomen, pelvis on 05/18/2021 showed increasing size of pulmonary nodules.     On 12/16/2023 she underwent biopsy of a right superior anterior chest wall mass. Pathology showed leiomyosarcoma; immunohistochemical studies for estrogen receptors were negative.     On 02/14/2023 she began therapy with trabectedin. She received a total of 6 cycles of therapy.    On 06/28/2023 she underwent PET/CT scan after she noted an increase in size of the biopsy proven metastatic sarcomatous lesion in the right superior anterior chest wall. Imaging showed increase in size and metabolic activity of the right chest wall mass with involvement of the adjacent sternum and rib; a spiculated mixed solid and groundglass right upper lobe pulmonary nodule with SUV 3.3.    From 07/17/2023 to 08/04/2023 she received radiation therapy to the anterior right chest wall mass.     All of the above was performed at [facility].     On 08/07/2022, patient's tumor from the right chest wall was reviewed at [facility] - tumor was negative for estrogen receptors and had a Ki67 of 40-45%.    [facility] the following: mutations in TP53,  RB1, and PTEN; TMB 1.6 m/MB; normal MMR; PDL1 TPS <1% and CPS <1.    From 11/01/2023 to 11/10/2023 she received radiation therapy to the right upper chest.     Routine imaging on 03/10/2025 showed increase in size of an established right lower lobe pulmonary nodule.     From 04/18/2025 to 04/28/2025 she received stereotactic ablative radiotherapy.     History of Present Illness  She denies any new self palpated masses. She denies any progressive respiratory symptoms. She has no pain. She continues to feel a \"lump\" in the left breast.     Performance Status   Karnofsky 100% - Normal, no complaints.    Past Medical History (historical data, reviewed by physician)   Metastatic leiomyosarcoma (as above); invasive mammary carcinoma, left breast (as above); essential hypertension; COPD.    Past Surgical History (historical data, reviewed by physician)   TAHBSO and resection of \"retroperitoneal\" sarcoma (as above); left breast lumpectomy and sentinel lymph node biopsy; ectopic pregnancy; tonsillectomy and adenoidectomy.     Family History (historical data, reviewed by physician)   Paternal cousin with breast cancer age 40.     Social History (historical data, reviewed by physician)   History of tobacco use.     Current Medications   dicyclomine 10 MG Oral Cap Take 1 capsule (10 mg total) by mouth.      meclizine 25 MG Oral Tab Take 1 tablet (25 mg total) by mouth.      buPROPion  MG Oral Tablet 12 Hr Take 1 tablet (150 mg total) by mouth in the morning.      fluticasone propionate 50 MCG/ACT Nasal Suspension 2 sprays by Nasal route in the morning.      losartan-hydroCHLOROthiazide 50-12.5 MG Oral Tab Take 1 tablet by mouth in the morning.      DULoxetine 30 MG Oral Cap DR Particles Take 1 capsule (30 mg total) by mouth in the morning and 1 capsule (30 mg total) in the evening and 1 capsule (30 mg total) before bedtime.      cyanocobalamin 1000 MCG Oral Tab Take 1 tablet (1,000 mcg total) by mouth in the morning.       LORazepam 0.5 MG Oral Tab Take 1 tablet (0.5 mg total) by mouth nightly.      ibuprofen 100 MG Oral Tab Take 4 tablets (400 mg total) by mouth.      Cholecalciferol 50 MCG (2000 UT) Oral Tab Take 2 tablets (4,000 Units total) by mouth in the morning.       Allergies   Ms. Bello is allergic to iopamidol and radiology contrast iodinated dyes.    Vital Signs   /61 (BP Location: Left arm, Patient Position: Sitting, Cuff Size: adult)   Pulse 66   Temp 98.1 °F (36.7 °C) (Oral)   Resp 18   Ht 1.575 m (5' 2\")   Wt 63.4 kg (139 lb 11.2 oz)   SpO2 98%   BMI 25.55 kg/m²     Physical Examination   Constitutional      Well developed, well nourished. No apparent distress.   Head   Normocephalic and atraumatic.  Eyes   Conjunctiva clear; sclera anicteric.  ENMT                 External nose normal; external ears normal.  Neck   Supple; no masses.  Chest   Stable superior anterior chest wall mass.   Breasts   Left breast with palpable nodularity in the area of previous surgery.   Lymphatics  No cervical, supraclavicular, axillary adenopathy.  Respiratory          Normal effort; no respiratory distress; clear to auscultation bilaterally.  Cardiovascular  Regular rate and rhythm; normal S1S2.  Abdomen  Not distended.   Extremities  No lower extremity edema.   Neurologic           Motor and sensory grossly intact.  Psychiatric          Mood and affect appropriate.    Laboratory   No results found for this or any previous visit (from the past 48 hours).    Impression and Plan   1.   Metastatic leiomyosarcoma: Patient has no disease related symptoms. She completed radiation therapy to an enlarging right lung mass. I recommend continued active surveillance with repeat CT imaging in 3 months.     2.   Port-a-cath: Flushed today.     3.   Left breast nodularity: Bilateral diagnostic mammography is ordered.     Planned Follow Up   Patient will return for follow up in 3 months with imaging.     Encounter  Time  Pre-charting/reviewing medical records: 5 minutes.  In room with patient obtaining history, performing exam, counseling on diagnosis, and reviewing plan: 15 minutes.  Orders/notes: - minutes.  Total time: 20 minutes.    Electronically Signed by:     Justyn Zelaya M.D.  System Medical Director, Oncology Services  [facility]

## 2025-05-21 ENCOUNTER — HOSPITAL ENCOUNTER (OUTPATIENT)
Dept: ULTRASOUND IMAGING | Facility: HOSPITAL | Age: 70
Discharge: HOME | End: 2025-05-21
Attending: PHYSICIAN ASSISTANT
Payer: MEDICARE

## 2025-05-21 ENCOUNTER — HOSPITAL ENCOUNTER (OUTPATIENT)
Dept: MAMMOGRAPHY | Facility: HOSPITAL | Age: 70
Discharge: HOME | End: 2025-05-21
Attending: PHYSICIAN ASSISTANT
Payer: MEDICARE

## 2025-05-21 DIAGNOSIS — C49.9 LEIOMYOSARCOMA (HCC): ICD-10-CM

## 2025-05-21 DIAGNOSIS — Z12.31 ENCOUNTER FOR SCREENING MAMMOGRAM FOR MALIGNANT NEOPLASM OF BREAST: ICD-10-CM

## 2025-05-21 DIAGNOSIS — N63.20 MASS OF LEFT BREAST, UNSPECIFIED QUADRANT: ICD-10-CM

## 2025-05-21 PROCEDURE — 77065 DX MAMMO INCL CAD UNI: CPT | Performed by: PHYSICIAN ASSISTANT

## 2025-05-21 PROCEDURE — 77061 BREAST TOMOSYNTHESIS UNI: CPT | Performed by: PHYSICIAN ASSISTANT

## 2025-05-22 ENCOUNTER — HOSPITAL ENCOUNTER (OUTPATIENT)
Dept: ULTRASOUND IMAGING | Facility: HOSPITAL | Age: 70
Discharge: HOME | End: 2025-05-22
Attending: PHYSICIAN ASSISTANT
Payer: MEDICARE

## 2025-05-22 PROCEDURE — 76642 ULTRASOUND BREAST LIMITED: CPT | Performed by: PHYSICIAN ASSISTANT

## 2025-08-04 ENCOUNTER — HOSPITAL ENCOUNTER (OUTPATIENT)
Dept: CT IMAGING | Facility: HOSPITAL | Age: 70
Discharge: HOME | End: 2025-08-04
Attending: SPECIALIST

## 2025-08-04 DIAGNOSIS — C49.9 LEIOMYOSARCOMA (HCC): ICD-10-CM

## 2025-08-04 DIAGNOSIS — C78.01 SECONDARY SARCOMA OF RIGHT LUNG (HCC): ICD-10-CM

## 2025-08-04 PROCEDURE — 74176 CT ABD & PELVIS W/O CONTRAST: CPT | Performed by: SPECIALIST

## 2025-08-04 PROCEDURE — 71250 CT THORAX DX C-: CPT | Performed by: SPECIALIST

## 2025-08-08 ENCOUNTER — OFFICE VISIT (OUTPATIENT)
Dept: RADIATION ONCOLOGY | Facility: HOSPITAL | Age: 70
End: 2025-08-08
Attending: RADIOLOGY

## 2025-08-08 VITALS
RESPIRATION RATE: 16 BRPM | OXYGEN SATURATION: 96 % | BODY MASS INDEX: 26 KG/M2 | HEART RATE: 65 BPM | WEIGHT: 142.81 LBS | TEMPERATURE: 98 F | DIASTOLIC BLOOD PRESSURE: 95 MMHG | SYSTOLIC BLOOD PRESSURE: 150 MMHG

## 2025-08-08 DIAGNOSIS — C78.01 SECONDARY MALIGNANCY OF RIGHT LOWER LOBE OF LUNG (HCC): Primary | ICD-10-CM

## 2025-08-08 PROCEDURE — 99211 OFF/OP EST MAY X REQ PHY/QHP: CPT

## 2025-08-08 RX ORDER — FLUTICASONE FUROATE, UMECLIDINIUM BROMIDE AND VILANTEROL TRIFENATATE 100; 62.5; 25 UG/1; UG/1; UG/1
1 POWDER RESPIRATORY (INHALATION) DAILY
COMMUNITY
Start: 2025-07-14

## 2025-08-08 RX ORDER — GABAPENTIN 300 MG/1
300 CAPSULE ORAL NIGHTLY
COMMUNITY
Start: 2025-06-03

## 2025-08-11 ENCOUNTER — APPOINTMENT (OUTPATIENT)
Facility: LOCATION | Age: 70
End: 2025-08-11
Attending: SPECIALIST